# Patient Record
Sex: FEMALE | Race: WHITE | NOT HISPANIC OR LATINO | Employment: OTHER | URBAN - METROPOLITAN AREA
[De-identification: names, ages, dates, MRNs, and addresses within clinical notes are randomized per-mention and may not be internally consistent; named-entity substitution may affect disease eponyms.]

---

## 2019-06-29 ENCOUNTER — HOSPITAL ENCOUNTER (EMERGENCY)
Facility: MEDICAL CENTER | Age: 74
End: 2019-06-29
Attending: EMERGENCY MEDICINE
Payer: MEDICARE

## 2019-06-29 ENCOUNTER — APPOINTMENT (OUTPATIENT)
Dept: RADIOLOGY | Facility: MEDICAL CENTER | Age: 74
End: 2019-06-29
Attending: EMERGENCY MEDICINE
Payer: MEDICARE

## 2019-06-29 VITALS
HEIGHT: 67 IN | HEART RATE: 65 BPM | DIASTOLIC BLOOD PRESSURE: 82 MMHG | OXYGEN SATURATION: 99 % | SYSTOLIC BLOOD PRESSURE: 163 MMHG | WEIGHT: 131.61 LBS | RESPIRATION RATE: 16 BRPM | BODY MASS INDEX: 20.66 KG/M2 | TEMPERATURE: 98.5 F

## 2019-06-29 DIAGNOSIS — L03.115 CELLULITIS OF RIGHT LOWER EXTREMITY: ICD-10-CM

## 2019-06-29 PROCEDURE — 99284 EMERGENCY DEPT VISIT MOD MDM: CPT

## 2019-06-29 PROCEDURE — 700102 HCHG RX REV CODE 250 W/ 637 OVERRIDE(OP)

## 2019-06-29 PROCEDURE — 93971 EXTREMITY STUDY: CPT | Mod: RT

## 2019-06-29 PROCEDURE — A9270 NON-COVERED ITEM OR SERVICE: HCPCS

## 2019-06-29 PROCEDURE — A9270 NON-COVERED ITEM OR SERVICE: HCPCS | Performed by: EMERGENCY MEDICINE

## 2019-06-29 PROCEDURE — 700102 HCHG RX REV CODE 250 W/ 637 OVERRIDE(OP): Performed by: EMERGENCY MEDICINE

## 2019-06-29 RX ORDER — SULFAMETHOXAZOLE AND TRIMETHOPRIM 800; 160 MG/1; MG/1
1 TABLET ORAL ONCE
Status: COMPLETED | OUTPATIENT
Start: 2019-06-29 | End: 2019-06-29

## 2019-06-29 RX ORDER — IBUPROFEN 200 MG
200-600 TABLET ORAL EVERY 6 HOURS PRN
Status: SHIPPED | COMMUNITY
End: 2019-11-16

## 2019-06-29 RX ORDER — CEPHALEXIN 250 MG/1
CAPSULE ORAL
Status: COMPLETED
Start: 2019-06-29 | End: 2019-06-29

## 2019-06-29 RX ORDER — CEPHALEXIN 250 MG/1
500 CAPSULE ORAL ONCE
Status: COMPLETED | OUTPATIENT
Start: 2019-06-29 | End: 2019-06-29

## 2019-06-29 RX ORDER — MULTIVIT WITH MINERALS/LUTEIN
2000 TABLET ORAL DAILY
COMMUNITY

## 2019-06-29 RX ORDER — SULFAMETHOXAZOLE AND TRIMETHOPRIM 800; 160 MG/1; MG/1
TABLET ORAL
Status: COMPLETED
Start: 2019-06-29 | End: 2019-06-29

## 2019-06-29 RX ORDER — MULTIVIT WITH MINERALS/LUTEIN
1000 TABLET ORAL DAILY
Status: SHIPPED | COMMUNITY
End: 2019-11-16

## 2019-06-29 RX ORDER — CEPHALEXIN 500 MG/1
500 CAPSULE ORAL 3 TIMES DAILY
Qty: 21 CAP | Refills: 0 | Status: SHIPPED | OUTPATIENT
Start: 2019-06-29 | End: 2019-07-06

## 2019-06-29 RX ORDER — ACETAMINOPHEN 325 MG/1
650 TABLET ORAL ONCE
Status: COMPLETED | OUTPATIENT
Start: 2019-06-29 | End: 2019-06-29

## 2019-06-29 RX ORDER — SULFAMETHOXAZOLE AND TRIMETHOPRIM 800; 160 MG/1; MG/1
1 TABLET ORAL EVERY 12 HOURS
Qty: 14 TAB | Refills: 0 | Status: SHIPPED | OUTPATIENT
Start: 2019-06-29 | End: 2019-07-06

## 2019-06-29 RX ORDER — ITRACONAZOLE 100 MG/1
200 CAPSULE ORAL DAILY
Qty: 30 CAP | Refills: 0 | Status: SHIPPED | OUTPATIENT
Start: 2019-06-29 | End: 2019-11-16

## 2019-06-29 RX ADMIN — ACETAMINOPHEN 650 MG: 325 TABLET, FILM COATED ORAL at 11:58

## 2019-06-29 RX ADMIN — SULFAMETHOXAZOLE AND TRIMETHOPRIM 1 TABLET: 800; 160 TABLET ORAL at 13:31

## 2019-06-29 RX ADMIN — CEPHALEXIN 500 MG: 250 CAPSULE ORAL at 13:31

## 2019-06-29 NOTE — ED PROVIDER NOTES
ED Provider Note    CHIEF COMPLAINT  Chief Complaint   Patient presents with   • Leg Pain     right thigh, started thursday night while pt was sleeping.       HPI  Columba Pennington is a 73 y.o. female who presents with a report that she started having pain in her right thigh with a rash this past Thursday night while she was sleeping.  This is when she first noticed it but she apparently was working in the garden with and had a couple of pokes through her jeans at the right anterior distal thigh.  The patient states that outpatient this occurred about 3 to 4 days ago when working in the garden.  Denies any other complaints such as fever, chills, sweats.  She says the pain is increasing in severity and well localized nonradiating with no alleviating factors    REVIEW OF SYSTEMS  See HPI for further details. All other systems are negative.     PAST MEDICAL HISTORY  Past Medical History:   Diagnosis Date   • Arthritis     general bilat hips / back   • Chronic low back pain    • Pain     left hip 8/10   • TBI (Traumatic Brain Injury) 1960 1960       FAMILY HISTORY  Family History   Problem Relation Age of Onset   • Diabetes Mother        SOCIAL HISTORY   reports that she quit smoking about 55 years ago. Her smoking use included Cigarettes. She has a 0.50 pack-year smoking history. She has never used smokeless tobacco. She reports that she does not drink alcohol or use drugs.    SURGICAL HISTORY  Past Surgical History:   Procedure Laterality Date   • HIP ARTHROPLASTY TOTAL  3/1/2010    Performed by EVAN MILLS at SURGERY [facility]   • HIP ARTHROPLASTY TOTAL  12/7/2009    Performed by EVAN MILLS at SURGERY [facility]   • APPENDECTOMY  1962   • CRANIOTOMY BRAIN LAB  1960    after accident       CURRENT MEDICATIONS  Home Medications     Reviewed by Monica Spicer (Pharmacy Tech) on 06/29/19 at 1211  Med List Status: Complete   Medication Last Dose Status   Ascorbic Acid (VITAMIN C) 1000 MG  "Tab 6/28/2019 Active   B Complex Vitamins (VITAMIN B COMPLEX PO) 6/28/2019 Active   ibuprofen (MOTRIN) 200 MG Tab 6/29/2019 Active   Multiple Vitamin (MULTIVITAMIN PO) 6/28/2019 Active   TURMERIC PO 6/28/2019 Active   vitamin E (VITAMIN E) 1000 UNIT Cap 6/28/2019 Active                ALLERGIES  No Known Allergies    PHYSICAL EXAM  VITAL SIGNS: BP (!) 163/82   Pulse 66   Temp 36.8 °C (98.3 °F) (Temporal)   Resp 16   Ht 1.702 m (5' 7\")   Wt 59.7 kg (131 lb 9.8 oz)   SpO2 99%   BMI 20.61 kg/m²    Constitutional: Well developed, Well nourished, No acute distress, Non-toxic appearance.   HENT: Normocephalic, Atraumatic, Bilateral external ears normal, Oropharynx is clear mucous membranes are moist. No oral exudates or nasal discharge.   Eyes: Pupils are equal round and reactive, EOMI, Conjunctiva normal, No discharge.   Neck: Normal range of motion, No tenderness, Supple, No stridor. No meningismus.  Lymphatic: No lymphadenopathy noted.   Cardiovascular: Regular rate and rhythm without murmur rub or gallop.  Thorax & Lungs: Clear breath sounds bilaterally without wheezes, rhonchi or rales. There is no chest wall tenderness.   Abdomen: Soft non-tender non-distended. There is no rebound or guarding. No organomegaly is appreciated. Bowel sounds are normal.  Skin: Diffuse subtle stippled mild erythematous right thigh rash.  This is tender diffusely but more focally at the right thigh neurovascular bundle  Back: No CVA or spinal tenderness.   Extremities: Intact distal pulses, No edema, No tenderness, No cyanosis, No clubbing. Capillary refill is less than 2 seconds.  Musculoskeletal: Good range of motion in all major joints.  Right thigh neurovascular bundle tenderness   neurologic: Alert & oriented x 3, Normal motor function, Normal sensory function, No focal deficits noted. Reflexes are normal.  Psychiatric: Affect normal, Judgment normal, Mood normal. There is no suicidal ideation or patient reported " hallucinations.       RADIOLOGY/PROCEDURES  US-EXTREMITY VENOUS LOWER UNILAT RIGHT   Final Result      No evidence of right lower extremity deep venous thrombosis.            COURSE & MEDICAL DECISION MAKING  Pertinent Labs & Imaging studies reviewed. (See chart for details)  I had some concerns of possible blood clot and therefore ruled out DVT with right lower extremely ultrasound which is negative.  I am concerned about cellulitis but also concerned about Isela Contreras's disease given the history of being poked by [employer] earlier this week.  I believe she is best suited to treat both cellulitis and Isela Garden's disease with a combination of Sporanox as well as Bactrim and Keflex.  She will follow-up with her doctor to understand in the next week or 2 whether she should continue Sporanox therapy.  She will use Tylenol and ibuprofen for pain control and return if any significant change in symptoms    FINAL IMPRESSION  1. Cellulitis of right lower extremity             Electronically signed by: Jake Mckinney, 6/29/2019 1:38 PM

## 2019-06-29 NOTE — ED TRIAGE NOTES
"Chief Complaint   Patient presents with   • Leg Pain     right thigh, started thursday night while pt was sleeping.     Pt reports that she has never had a pain like this, denies injury.  Reports pain is different than pain referred from back. Pt also reports that she has a rash to her thigh. Pt was sent from .   BP (!) 163/82   Pulse 66   Temp 36.8 °C (98.3 °F) (Temporal)   Resp 16   Ht 1.702 m (5' 7\")   Wt 59.7 kg (131 lb 9.8 oz)   SpO2 99%   Pt informed of wait times. Educated on triage process.  Asked to return to triage RN for any new or worsening of symptoms. Thanked for patience.        "

## 2019-06-29 NOTE — ED NOTES
Pt assessed, R anterior/medial thigh pain since Thursday. Atraumatic. Ambulatory. Will cont to monitor. Rash noted as well.

## 2019-06-29 NOTE — DISCHARGE INSTRUCTIONS
Please see your primary care doctor in follow-up.  I am placing you on itraconazole for possible Isela Contreras's disease although this may just be typical cellulitis.  Your doctor may want to take you off the antifungal treatment if you are improving

## 2019-06-29 NOTE — ED NOTES
Discharge instructions provided. Rx  x3 given and pt educated on how and when to take medications, pt given oral and written dosages of tylenol and ibuprofen to take home for pain including max dosages, pt educated on s/s of infection and exacerbation of infection,  Pt verbalized the understanding of discharge instructions to follow up with PCP and to return to ER if condition worsens pt educated to seek medical attention for site assessment before Rx ran out, pt educated to seek medical attention if infection is noted..  Pt ambulated out of ER without difficulty.

## 2019-11-16 ENCOUNTER — APPOINTMENT (OUTPATIENT)
Dept: RADIOLOGY | Facility: MEDICAL CENTER | Age: 74
End: 2019-11-16
Attending: EMERGENCY MEDICINE
Payer: MEDICARE

## 2019-11-16 ENCOUNTER — HOSPITAL ENCOUNTER (EMERGENCY)
Facility: MEDICAL CENTER | Age: 74
End: 2019-11-16
Attending: EMERGENCY MEDICINE
Payer: MEDICARE

## 2019-11-16 VITALS
DIASTOLIC BLOOD PRESSURE: 74 MMHG | BODY MASS INDEX: 19.72 KG/M2 | SYSTOLIC BLOOD PRESSURE: 141 MMHG | TEMPERATURE: 97.7 F | WEIGHT: 125.66 LBS | OXYGEN SATURATION: 95 % | HEIGHT: 67 IN | RESPIRATION RATE: 16 BRPM | HEART RATE: 65 BPM

## 2019-11-16 DIAGNOSIS — R19.00 ABDOMINAL MASS, UNSPECIFIED ABDOMINAL LOCATION: ICD-10-CM

## 2019-11-16 DIAGNOSIS — R10.13 EPIGASTRIC PAIN: ICD-10-CM

## 2019-11-16 LAB
ALBUMIN SERPL BCP-MCNC: 4.2 G/DL (ref 3.2–4.9)
ALBUMIN/GLOB SERPL: 1.8 G/DL
ALP SERPL-CCNC: 132 U/L (ref 30–99)
ALT SERPL-CCNC: 25 U/L (ref 2–50)
ANION GAP SERPL CALC-SCNC: 12 MMOL/L (ref 0–11.9)
APPEARANCE UR: CLEAR
AST SERPL-CCNC: 30 U/L (ref 12–45)
BASOPHILS # BLD AUTO: 0.9 % (ref 0–1.8)
BASOPHILS # BLD: 0.05 K/UL (ref 0–0.12)
BILIRUB SERPL-MCNC: 0.6 MG/DL (ref 0.1–1.5)
BILIRUB UR QL STRIP.AUTO: NEGATIVE
BUN SERPL-MCNC: 13 MG/DL (ref 8–22)
CALCIUM SERPL-MCNC: 9.9 MG/DL (ref 8.4–10.2)
CHLORIDE SERPL-SCNC: 104 MMOL/L (ref 96–112)
CO2 SERPL-SCNC: 25 MMOL/L (ref 20–33)
COLOR UR: YELLOW
CREAT SERPL-MCNC: 0.71 MG/DL (ref 0.5–1.4)
EOSINOPHIL # BLD AUTO: 0.07 K/UL (ref 0–0.51)
EOSINOPHIL NFR BLD: 1.3 % (ref 0–6.9)
ERYTHROCYTE [DISTWIDTH] IN BLOOD BY AUTOMATED COUNT: 40.3 FL (ref 35.9–50)
GLOBULIN SER CALC-MCNC: 2.3 G/DL (ref 1.9–3.5)
GLUCOSE SERPL-MCNC: 94 MG/DL (ref 65–99)
GLUCOSE UR STRIP.AUTO-MCNC: NEGATIVE MG/DL
HCT VFR BLD AUTO: 44.5 % (ref 37–47)
HGB BLD-MCNC: 14.9 G/DL (ref 12–16)
IMM GRANULOCYTES # BLD AUTO: 0.02 K/UL (ref 0–0.11)
IMM GRANULOCYTES NFR BLD AUTO: 0.4 % (ref 0–0.9)
KETONES UR STRIP.AUTO-MCNC: NEGATIVE MG/DL
LEUKOCYTE ESTERASE UR QL STRIP.AUTO: NEGATIVE
LIPASE SERPL-CCNC: 24 U/L (ref 7–58)
LYMPHOCYTES # BLD AUTO: 0.61 K/UL (ref 1–4.8)
LYMPHOCYTES NFR BLD: 11.3 % (ref 22–41)
MCH RBC QN AUTO: 29.7 PG (ref 27–33)
MCHC RBC AUTO-ENTMCNC: 33.5 G/DL (ref 33.6–35)
MCV RBC AUTO: 88.8 FL (ref 81.4–97.8)
MICRO URNS: NORMAL
MONOCYTES # BLD AUTO: 0.5 K/UL (ref 0–0.85)
MONOCYTES NFR BLD AUTO: 9.3 % (ref 0–13.4)
NEUTROPHILS # BLD AUTO: 4.14 K/UL (ref 2–7.15)
NEUTROPHILS NFR BLD: 76.8 % (ref 44–72)
NITRITE UR QL STRIP.AUTO: NEGATIVE
NRBC # BLD AUTO: 0 K/UL
NRBC BLD-RTO: 0 /100 WBC
PH UR STRIP.AUTO: 6.5 [PH] (ref 5–8)
PLATELET # BLD AUTO: 257 K/UL (ref 164–446)
PMV BLD AUTO: 9.7 FL (ref 9–12.9)
POTASSIUM SERPL-SCNC: 4 MMOL/L (ref 3.6–5.5)
PROT SERPL-MCNC: 6.5 G/DL (ref 6–8.2)
PROT UR QL STRIP: NEGATIVE MG/DL
RBC # BLD AUTO: 5.01 M/UL (ref 4.2–5.4)
RBC UR QL AUTO: NEGATIVE
SODIUM SERPL-SCNC: 141 MMOL/L (ref 135–145)
SP GR UR STRIP.AUTO: 1.01
WBC # BLD AUTO: 5.4 K/UL (ref 4.8–10.8)

## 2019-11-16 PROCEDURE — 36415 COLL VENOUS BLD VENIPUNCTURE: CPT

## 2019-11-16 PROCEDURE — 80053 COMPREHEN METABOLIC PANEL: CPT

## 2019-11-16 PROCEDURE — 85025 COMPLETE CBC W/AUTO DIFF WBC: CPT

## 2019-11-16 PROCEDURE — 99284 EMERGENCY DEPT VISIT MOD MDM: CPT

## 2019-11-16 PROCEDURE — 700117 HCHG RX CONTRAST REV CODE 255: Performed by: EMERGENCY MEDICINE

## 2019-11-16 PROCEDURE — 81003 URINALYSIS AUTO W/O SCOPE: CPT

## 2019-11-16 PROCEDURE — 74177 CT ABD & PELVIS W/CONTRAST: CPT

## 2019-11-16 PROCEDURE — 83690 ASSAY OF LIPASE: CPT

## 2019-11-16 RX ORDER — ACETAMINOPHEN 325 MG/1
650 TABLET ORAL EVERY 6 HOURS PRN
Status: SHIPPED | COMMUNITY
End: 2020-05-19

## 2019-11-16 RX ORDER — HYDROCODONE BITARTRATE AND ACETAMINOPHEN 5; 325 MG/1; MG/1
1-2 TABLET ORAL EVERY 8 HOURS PRN
Qty: 15 TAB | Refills: 0 | Status: SHIPPED | OUTPATIENT
Start: 2019-11-16 | End: 2019-11-21

## 2019-11-16 RX ADMIN — IOHEXOL 100 ML: 350 INJECTION, SOLUTION INTRAVENOUS at 11:01

## 2019-11-16 ASSESSMENT — LIFESTYLE VARIABLES: DO YOU DRINK ALCOHOL: NO

## 2019-11-16 NOTE — ED TRIAGE NOTES
"Presents complaining of moderate abdominal pain recurring since this past September with associated nausea. She his under the care of her PCP.  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  Chief Complaint   Patient presents with   • Abdominal Pain     /82   Pulse 77   Temp 36.7 °C (98 °F) (Temporal)   Resp 18   Ht 1.702 m (5' 7\")   Wt 57 kg (125 lb 10.6 oz)   SpO2 94%   BMI 19.68 kg/m²     "

## 2019-11-16 NOTE — ED NOTES
Pt alert and oriented, NAD, VSS, IV discontinued in tact. Patient verbalized understanding of dc instructions and follow up.

## 2019-11-16 NOTE — DISCHARGE INSTRUCTIONS
Follow up with Outpatient biopsy, Can call [phone] ask for imaging    Follow up with Dr. Malcolm as scheduled on 11/21

## 2019-11-16 NOTE — ED PROVIDER NOTES
ED Provider Note    CHIEF COMPLAINT  Chief Complaint   Patient presents with   • Abdominal Pain       HPI  Columba Pennington is a 74 y.o. female who presents to emerge from today with complaints of abdominal pain.  Patient states she has abdominal pain since September getting worse had ultrasound performed 10/31 which showed possibility of incarcerated hernia.  Pain became more severe over the last 24 hours with episodes of nausea.  Pain is described as sharp to dull ache mid abdomen rating to the side on the right rated a 5/10 no fever chills she is had decreased appetite and weight loss of 5 to 10 pounds.    REVIEW OF SYSTEMS  See HPI for further details. All other systems are negative.     PAST MEDICAL HISTORY  Past Medical History:   Diagnosis Date   • TBI (Traumatic Brain Injury) 1960   • Arthritis     general bilat hips / back   • Chronic low back pain    • Pain     left hip 8/10       FAMILY HISTORY  [unfilled]    SOCIAL HISTORY  Social History     Socioeconomic History   • Marital status: Single     Spouse name: Not on file   • Number of children: 0   • Years of education: Not on file   • Highest education level: Not on file   Occupational History   • Occupation: Artist - watercolor     Employer: self employed   Social Needs   • Financial resource strain: Not on file   • Food insecurity:     Worry: Not on file     Inability: Not on file   • Transportation needs:     Medical: Not on file     Non-medical: Not on file   Tobacco Use   • Smoking status: Former Smoker     Packs/day: 0.50     Years: 1.00     Pack years: 0.50     Types: Cigarettes     Last attempt to quit: 1964     Years since quittin.9   • Smokeless tobacco: Never Used   Substance and Sexual Activity   • Alcohol use: No   • Drug use: No   • Sexual activity: Not on file   Lifestyle   • Physical activity:     Days per week: Not on file     Minutes per session: Not on file   • Stress: Not on file   Relationships   • Social connections:     " Talks on phone: Not on file     Gets together: Not on file     Attends Rastafari service: Not on file     Active member of club or organization: Not on file     Attends meetings of clubs or organizations: Not on file     Relationship status: Not on file   • Intimate partner violence:     Fear of current or ex partner: Not on file     Emotionally abused: Not on file     Physically abused: Not on file     Forced sexual activity: Not on file   Other Topics Concern   • Not on file   Social History Narrative   • Not on file       SURGICAL HISTORY  Past Surgical History:   Procedure Laterality Date   • HIP ARTHROPLASTY TOTAL  3/1/2010    Performed by EVAN MILLS at SURGERY [facility]S   • HIP ARTHROPLASTY TOTAL  12/7/2009    Performed by EVAN MILLS at SURGERY [facility] ORS   • APPENDECTOMY  1962   • CRANIOTOMY BRAIN LAB  1960    after accident       CURRENT MEDICATIONS  Home Medications     Reviewed by Monica Spicer (Pharmacy Tech) on 11/16/19 at 0957  Med List Status: Complete   Medication Last Dose Status   acetaminophen (TYLENOL) 325 MG Tab ABOUT 1 WEEK AGO Active   Ascorbic Acid (VITAMIN C) 1000 MG Tab 11/15/2019 Active   B Complex Vitamins (VITAMIN B COMPLEX PO) 11/15/2019 Active   Multiple Vitamin (MULTIVITAMIN PO) 11/15/2019 Active   TURMERIC PO 11/15/2019 Active                ALLERGIES  No Known Allergies    PHYSICAL EXAM  VITAL SIGNS: /74   Pulse 65   Temp 36.5 °C (97.7 °F)   Resp 16   Ht 1.702 m (5' 7\")   Wt 57 kg (125 lb 10.6 oz)   SpO2 95%   BMI 19.68 kg/m²  Room air O2: 95    Constitutional: Well developed, Well nourished, mild acute distress, Non-toxic appearance.   HENT: Normocephalic, Atraumatic, Bilateral external ears normal, Oropharynx moist, No oral exudates, Nose normal.   Eyes: PERRLA, EOMI, Conjunctiva normal, No discharge.   Neck: Normal range of motion, No tenderness, Supple, No stridor.   Lymphatic: No lymphadenopathy noted.   Cardiovascular: Normal heart " rate, Normal rhythm, No murmurs, No rubs, No gallops.   Thorax & Lungs: Normal breath sounds, No respiratory distress, No wheezing, No chest tenderness.   Abdomen: Bowel sounds normal, Soft,  tenderness, palpable mid abdominal mass, No pulsatile masses.  No rebound, guarding or peritoneal signs noted  Skin: Warm, Dry, No erythema, No rash.   Back: No tenderness, No CVA tenderness.   Extremities: Intact distal pulses, No edema, No tenderness, No cyanosis, No clubbing.   Musculoskeletal: Good range of motion in all major joints. No tenderness to palpation or major deformities noted.   Neurologic: Alert & oriented x 3, Normal motor function, Normal sensory function, No focal deficits noted.   Psychiatric: Affect normal, Judgment normal, Mood normal.         RADIOLOGY/PROCEDURES  CT-ABDOMEN-PELVIS WITH   Final Result      1.  Abnormal large solid mass centered in the small bowel mesentery with encasement of vessels is identified as described above. There is also some retroperitoneal adenopathy. Differential diagnosis includes lymphoma. Carcinoid tumor or desmoid tumor is    also possible. The mass does not appear to contain calcifications. Exophytic mass arising from small or large bowel is also within the differential diagnosis.      2.  Small amount of free peritoneal fluid is also identified.               COURSE & MEDICAL DECISION MAKING  Pertinent Labs & Imaging studies reviewed. (See chart for details)  Patient has abdominal mass large 15 x 15 x 10 most likely is causing patient's pain there is no evidence of hernia.  Patient has normal white blood cell count and normal temperature.  Discussed with patient in length that she needs next step of a biopsy that this cannot be done to Monday she wishes to go home I discussed case with radiologist Dr. Arceo and with scheduling as well as radiology and they will set up a CT scan guided biopsy on Monday if approved by insurance and she also has follow-up with surgeon   Yun on 11/21 meanwhile she was placed on pain medication of Norco for breakthrough pain we will try Tylenol Motrin first she understands this is opiate can be addictive no operating heavy machinery use of alcohol or driving on medication.  Verbalized understand instructions need for follow-up return if increased pain not controlled by medication, fever vomiting worsening symptoms patient verbalizes above instructions need for follow-up.    FINAL IMPRESSION  1.  Acute abdominal pain  2.  Acute peritoneal mass  3.         Electronically signed by: Amando Berry, 11/16/2019 3:34 PM

## 2020-01-24 ENCOUNTER — HOSPITAL ENCOUNTER (EMERGENCY)
Facility: MEDICAL CENTER | Age: 75
End: 2020-01-24
Attending: EMERGENCY MEDICINE
Payer: MEDICARE

## 2020-01-24 ENCOUNTER — APPOINTMENT (OUTPATIENT)
Dept: RADIOLOGY | Facility: MEDICAL CENTER | Age: 75
End: 2020-01-24
Attending: EMERGENCY MEDICINE
Payer: MEDICARE

## 2020-01-24 VITALS
BODY MASS INDEX: 19.62 KG/M2 | DIASTOLIC BLOOD PRESSURE: 68 MMHG | TEMPERATURE: 97.7 F | HEART RATE: 80 BPM | SYSTOLIC BLOOD PRESSURE: 123 MMHG | HEIGHT: 67 IN | RESPIRATION RATE: 16 BRPM | OXYGEN SATURATION: 96 % | WEIGHT: 125 LBS

## 2020-01-24 DIAGNOSIS — Z45.2 PICC (PERIPHERALLY INSERTED CENTRAL CATHETER) IN PLACE: ICD-10-CM

## 2020-01-24 DIAGNOSIS — T82.838A BLEEDING FROM PICC LINE, INITIAL ENCOUNTER (HCC): ICD-10-CM

## 2020-01-24 PROCEDURE — 99283 EMERGENCY DEPT VISIT LOW MDM: CPT

## 2020-01-24 PROCEDURE — 303485 HCHG DRESSING MEDIUM

## 2020-01-24 ASSESSMENT — LIFESTYLE VARIABLES: DO YOU DRINK ALCOHOL: NO

## 2020-01-25 NOTE — ED TRIAGE NOTES
"Pt comes in w/ friend   Had PICC line placed today to R upper arm for impending Chemo treatment   \"I was in a hurry to leave\"  Now having pain and bleeding to site   Instructions told her to seek medical advise if these symptoms come up   "

## 2020-01-25 NOTE — ED NOTES
Pt had picc line placed in left upper arm. C/o pain. Scant blood noted. Flushed well and without pain. + blood drawn back.

## 2020-01-25 NOTE — ED PROVIDER NOTES
ED Provider Note    ED Provider Note    Scribed for Brett Stevens MD by Brett Stevens M.D.. 1/24/2020, 10:38 PM.    Primary care provider: Dianna Donadl M.D.  Means of arrival: Private  History obtained from: Patient  History limited by: None    CHIEF COMPLAINT  Chief Complaint   Patient presents with   • Arm Pain     PICC line placed today at 1400 today   it's been hurting since and bleeding        HPI  Columba Pennington is a 74 y.o. female who presents to the Emergency Department discomfort and bleeding at PEG site.  This was placed today at 2 PM.  Patient notes she was feeling well, she had a busy day and does admit doing some heavy lifting including a large bag of dog food when she was at the grocery store.  She noted about 1 hour ago small amount of blood within the line but around the bio patch.  This was placed so she may undergo chemotherapy for her lymphoma, this will start on Monday.  Patient notes minimal pain at the time, no immediate complications.  He said no fever, no significant trauma, no numbness, no weakness.  Pain is localized to the site of the PICC of the right upper arm but does radiate somewhat up the arm, this does not involve the neck or the shoulder of the chest.  Pain is exacerbated when she is pronating the arm and also abducting, improved somewhat with sitting still.  Patient is not anticoagulated, does have a history of diabetes and is on metformin.    REVIEW OF SYSTEMS  Pertinent positives include pain at PEG site with visible blood near the Biopatch. Pertinent negatives include no marked trauma, not anticoagulated, no chest pain, no fever.      PAST MEDICAL HISTORY   has a past medical history of Arthritis, Chronic low back pain, Pain, and TBI (Traumatic Brain Injury) 1960 (1960).    SURGICAL HISTORY   has a past surgical history that includes craniotomy brain lab (1960); appendectomy (1962); hip arthroplasty total (12/7/2009); and hip arthroplasty total  "(3/1/2010).    SOCIAL HISTORY  Social History     Tobacco Use   • Smoking status: Never Smoker   • Smokeless tobacco: Never Used   Substance Use Topics   • Alcohol use: No   • Drug use: No      Social History     Substance and Sexual Activity   Drug Use No       FAMILY HISTORY  Family History   Problem Relation Age of Onset   • Diabetes Mother        CURRENT MEDICATIONS  Home Medications     Reviewed by Bhargavi Decker R.N. (Registered Nurse) on 01/24/20 at 2203  Med List Status: Unable to Obtain   Medication Last Dose Status   acetaminophen (TYLENOL) 325 MG Tab  Active   Ascorbic Acid (VITAMIN C) 1000 MG Tab  Active   B Complex Vitamins (VITAMIN B COMPLEX PO)  Active   MELATONIN PO 1/23/2020 Active   metFORMIN (GLUCOPHAGE) 500 MG Tab 1/24/2020 Active   MILK THISTLE EXTRACT PO  Active   Multiple Vitamin (MULTIVITAMIN PO)  Active   SELENIUM PO  Active   TURMERIC PO  Active                ALLERGIES  No Known Allergies    PHYSICAL EXAM  VITAL SIGNS: /70   Pulse (!) 59   Temp 36.5 °C (97.7 °F) (Temporal)   Resp 20   Ht 1.702 m (5' 7\")   Wt 56.7 kg (125 lb)   SpO2 96%   BMI 19.58 kg/m²     General: Alert, no acute distress  Skin: Warm, dry, normal for ethnicity  Head: Normocephalic, atraumatic  Neck: Trachea midline, no tenderness, no carotid bruit, no JVD.  Cardiovascular: Regular rate and rhythm, No murmur, Normal peripheral perfusion  Respiratory: Lungs CTA, respirations are non-labored, breath sounds are equal  Musculoskeletal: No swelling, no deformity.  2+ radial pulses symmetrical bilaterally, brisk cap refill.  No proximal streaking, no palpable venous cord on exam of the right upper extremity.  There is no erythema, no warmth, no crepitus.  PICC site appears clean, dry, intact with scant dry blood noted approximating the Biopatch.  No axillary lymphadenopathy.  Neurological: Alert and oriented to person, place, time, and situation  Lymphatics: No lymphadenopathy  Psychiatric: Cooperative, " "appropriate mood & affect          RADIOLOGY  DX-CHEST-FOR LINE PLACEMENT Perform procedure in: Patient's Room   Final Result      Peripherally inserted catheter has been placed and the tip projects appropriately over the superior vena cava.      X-ray viewed by myself and interpreted by the radiologist      The radiologist's interpretation of all radiological studies have been reviewed by me.    COURSE & MEDICAL DECISION MAKING  Pertinent Labs & Imaging studies reviewed. (See chart for details)    10:38 PM - Patient seen and examined at bedside. Patient will be treated with dressing change. Ordered x-ray imaging to evaluate her symptoms. The differential diagnoses include but are not limited to: Muscle skeletal pain, PICC malfunction    2320: Patient reassessed, she is in no acute distress and relieved here of unremarkable imaging.  Nursing to change her dressing at this time.    Patient Vitals for the past 24 hrs:   BP Temp Temp src Pulse Resp SpO2 Height Weight   01/24/20 2212 130/70 36.5 °C (97.7 °F) Temporal (!) 59 20 96 % -- --   01/24/20 2156 130/70 -- Temporal -- -- -- -- --   01/24/20 2155 -- -- -- -- -- -- 1.702 m (5' 7\") 56.7 kg (125 lb)         Decision Making:  This is a 74 y.o. year old female who presents with pain at the site of PICC placed today radiating up the arm.  The line draws and flushes well, there is no significant active bleeding on the exam.  She is well-appearing and nontoxic and patient herself is fully neurovascular intact.  X-rays obtained and thankfully confirms line placement, recommend rest, ice, elevation, pain is more consistent with likely localized pain/inflammation from the recent procedure, she may continue to use her pick as planned by my assessment.    The patient will return for new or worsening symptoms and is stable at the time of discharge.    Patient has had high blood pressure while in the emergency department, felt likely secondary to medical condition. Counseled " patient to monitor blood pressure at home and follow up with primary care physician.     DISPOSITION:  Patient will be discharged home in stable condition.    FOLLOW UP:  Dianna Donald M.D.  [address] [place] [address] [id number] [phone]    In 3 days  As needed      OUTPATIENT MEDICATIONS:  New Prescriptions    No medications on file         FINAL IMPRESSION  1. Bleeding from PICC line, initial encounter ([facility])    2. PICC (peripherally inserted central catheter) in place          Brett SANDOVAL M.D. ([place]), am scribing for, and in the presence of, Brett Stevens MD.    Electronically signed by: Brett Stevens M.D. ([place]), 1/24/2020    Bertt SANDOVAL MD personally performed the services described in this documentation, as scribed by Brett Stevens M.D. in my presence, and it is both accurate and complete    The note accurately reflects work and decisions made by me.  Brett Stevens M.D.  1/24/2020  11:22 PM

## 2020-05-19 ENCOUNTER — HOSPITAL ENCOUNTER (EMERGENCY)
Facility: MEDICAL CENTER | Age: 75
End: 2020-05-19
Attending: EMERGENCY MEDICINE
Payer: MEDICARE

## 2020-05-19 ENCOUNTER — APPOINTMENT (OUTPATIENT)
Dept: RADIOLOGY | Facility: MEDICAL CENTER | Age: 75
End: 2020-05-19
Attending: EMERGENCY MEDICINE
Payer: MEDICARE

## 2020-05-19 VITALS
BODY MASS INDEX: 20.07 KG/M2 | DIASTOLIC BLOOD PRESSURE: 61 MMHG | HEART RATE: 91 BPM | OXYGEN SATURATION: 95 % | WEIGHT: 127.87 LBS | HEIGHT: 67 IN | RESPIRATION RATE: 16 BRPM | SYSTOLIC BLOOD PRESSURE: 111 MMHG | TEMPERATURE: 98 F

## 2020-05-19 DIAGNOSIS — R18.8 OTHER ASCITES: ICD-10-CM

## 2020-05-19 DIAGNOSIS — R10.84 ACUTE GENERALIZED ABDOMINAL PAIN: ICD-10-CM

## 2020-05-19 LAB
ALBUMIN SERPL BCP-MCNC: 3.2 G/DL (ref 3.2–4.9)
ALBUMIN/GLOB SERPL: 1.8 G/DL
ALP SERPL-CCNC: 91 U/L (ref 30–99)
ALT SERPL-CCNC: 9 U/L (ref 2–50)
ANION GAP SERPL CALC-SCNC: 13 MMOL/L (ref 7–16)
AST SERPL-CCNC: 20 U/L (ref 12–45)
BASOPHILS # BLD AUTO: 0.6 % (ref 0–1.8)
BASOPHILS # BLD: 0.05 K/UL (ref 0–0.12)
BILIRUB SERPL-MCNC: 0.4 MG/DL (ref 0.1–1.5)
BUN SERPL-MCNC: 16 MG/DL (ref 8–22)
CALCIUM SERPL-MCNC: 8.6 MG/DL (ref 8.4–10.2)
CHLORIDE SERPL-SCNC: 98 MMOL/L (ref 96–112)
CO2 SERPL-SCNC: 21 MMOL/L (ref 20–33)
CREAT SERPL-MCNC: 0.75 MG/DL (ref 0.5–1.4)
EOSINOPHIL # BLD AUTO: 0.17 K/UL (ref 0–0.51)
EOSINOPHIL NFR BLD: 2.1 % (ref 0–6.9)
ERYTHROCYTE [DISTWIDTH] IN BLOOD BY AUTOMATED COUNT: 43.5 FL (ref 35.9–50)
GLOBULIN SER CALC-MCNC: 1.8 G/DL (ref 1.9–3.5)
GLUCOSE SERPL-MCNC: 92 MG/DL (ref 65–99)
HCT VFR BLD AUTO: 42.2 % (ref 37–47)
HGB BLD-MCNC: 14.4 G/DL (ref 12–16)
IMM GRANULOCYTES # BLD AUTO: 0.02 K/UL (ref 0–0.11)
IMM GRANULOCYTES NFR BLD AUTO: 0.2 % (ref 0–0.9)
LIPASE SERPL-CCNC: 16 U/L (ref 7–58)
LYMPHOCYTES # BLD AUTO: 0.73 K/UL (ref 1–4.8)
LYMPHOCYTES NFR BLD: 8.9 % (ref 22–41)
MCH RBC QN AUTO: 30.1 PG (ref 27–33)
MCHC RBC AUTO-ENTMCNC: 34.1 G/DL (ref 33.6–35)
MCV RBC AUTO: 88.3 FL (ref 81.4–97.8)
MONOCYTES # BLD AUTO: 0.88 K/UL (ref 0–0.85)
MONOCYTES NFR BLD AUTO: 10.7 % (ref 0–13.4)
NEUTROPHILS # BLD AUTO: 6.34 K/UL (ref 2–7.15)
NEUTROPHILS NFR BLD: 77.5 % (ref 44–72)
NRBC # BLD AUTO: 0 K/UL
NRBC BLD-RTO: 0 /100 WBC
PLATELET # BLD AUTO: 376 K/UL (ref 164–446)
PMV BLD AUTO: 10.1 FL (ref 9–12.9)
POTASSIUM SERPL-SCNC: 4.3 MMOL/L (ref 3.6–5.5)
PROT SERPL-MCNC: 5 G/DL (ref 6–8.2)
RBC # BLD AUTO: 4.78 M/UL (ref 4.2–5.4)
SODIUM SERPL-SCNC: 132 MMOL/L (ref 135–145)
WBC # BLD AUTO: 8.2 K/UL (ref 4.8–10.8)

## 2020-05-19 PROCEDURE — 80053 COMPREHEN METABOLIC PANEL: CPT

## 2020-05-19 PROCEDURE — 49083 ABD PARACENTESIS W/IMAGING: CPT

## 2020-05-19 PROCEDURE — 96376 TX/PRO/DX INJ SAME DRUG ADON: CPT | Mod: XU

## 2020-05-19 PROCEDURE — A9270 NON-COVERED ITEM OR SERVICE: HCPCS | Performed by: EMERGENCY MEDICINE

## 2020-05-19 PROCEDURE — 74177 CT ABD & PELVIS W/CONTRAST: CPT

## 2020-05-19 PROCEDURE — 36415 COLL VENOUS BLD VENIPUNCTURE: CPT

## 2020-05-19 PROCEDURE — 99285 EMERGENCY DEPT VISIT HI MDM: CPT

## 2020-05-19 PROCEDURE — 700111 HCHG RX REV CODE 636 W/ 250 OVERRIDE (IP): Performed by: EMERGENCY MEDICINE

## 2020-05-19 PROCEDURE — 700117 HCHG RX CONTRAST REV CODE 255: Performed by: EMERGENCY MEDICINE

## 2020-05-19 PROCEDURE — 85025 COMPLETE CBC W/AUTO DIFF WBC: CPT

## 2020-05-19 PROCEDURE — 83690 ASSAY OF LIPASE: CPT

## 2020-05-19 PROCEDURE — 700105 HCHG RX REV CODE 258: Performed by: EMERGENCY MEDICINE

## 2020-05-19 PROCEDURE — 96375 TX/PRO/DX INJ NEW DRUG ADDON: CPT | Mod: XU

## 2020-05-19 PROCEDURE — 96374 THER/PROPH/DIAG INJ IV PUSH: CPT | Mod: XU

## 2020-05-19 PROCEDURE — 700102 HCHG RX REV CODE 250 W/ 637 OVERRIDE(OP): Performed by: EMERGENCY MEDICINE

## 2020-05-19 RX ORDER — ONDANSETRON 2 MG/ML
4 INJECTION INTRAMUSCULAR; INTRAVENOUS ONCE
Status: COMPLETED | OUTPATIENT
Start: 2020-05-19 | End: 2020-05-19

## 2020-05-19 RX ORDER — MORPHINE SULFATE 4 MG/ML
4 INJECTION, SOLUTION INTRAMUSCULAR; INTRAVENOUS ONCE
Status: COMPLETED | OUTPATIENT
Start: 2020-05-19 | End: 2020-05-19

## 2020-05-19 RX ORDER — SODIUM CHLORIDE 9 MG/ML
1000 INJECTION, SOLUTION INTRAVENOUS ONCE
Status: COMPLETED | OUTPATIENT
Start: 2020-05-19 | End: 2020-05-19

## 2020-05-19 RX ADMIN — IOHEXOL 100 ML: 350 INJECTION, SOLUTION INTRAVENOUS at 12:01

## 2020-05-19 RX ADMIN — ONDANSETRON HYDROCHLORIDE 4 MG: 2 SOLUTION INTRAMUSCULAR; INTRAVENOUS at 12:13

## 2020-05-19 RX ADMIN — SODIUM CHLORIDE 1000 ML: 9 INJECTION, SOLUTION INTRAVENOUS at 11:29

## 2020-05-19 RX ADMIN — ONDANSETRON 4 MG: 2 INJECTION INTRAMUSCULAR; INTRAVENOUS at 10:25

## 2020-05-19 RX ADMIN — LIDOCAINE HYDROCHLORIDE 30 ML: 20 SOLUTION OROPHARYNGEAL at 10:25

## 2020-05-19 RX ADMIN — MORPHINE SULFATE 4 MG: 4 INJECTION INTRAVENOUS at 10:24

## 2020-05-19 ASSESSMENT — FIBROSIS 4 INDEX: FIB4 SCORE: 1.75

## 2020-05-19 NOTE — ED PROVIDER NOTES
ED Provider Note    ED Provider  Means of arrival: Private vehicle  History obtained from: Patient  History limited by: None    CHIEF COMPLAINT  Chief Complaint   Patient presents with   • Abdominal Pain     Treatment for lymphoma and reports her ABD has become larger over the last 4 days and painful with nausea.       HPI  Columba Pennington is a 74 y.o. female who presents with complaints of distention.  She has a history of lymphoma is currently under chemotherapy she is getting IV infusions in the right arm PICC line.  She had her last dose of chemotherapy was on Tuesday and since that time she is developing increased abdominal distention, and diffuse generalized abdominal pain.  She saw her oncologist no ultrasound done and there is questionable new admits that the head of the pancreas on ultrasound.    Her abdominal pain is diffuse, generalized, worse with walking.    He has had no nausea no vomiting no diarrhea.  No constipation last bowel movement was yesterday.  No fevers chills, no cough or congestion.    REVIEW OF SYSTEMS  See HPI for further details. All other systems are negative.     PAST MEDICAL HISTORY   has a past medical history of Arthritis, Chronic low back pain, Pain, and TBI (Traumatic Brain Injury) 1960 (1960).    SOCIAL HISTORY  Social History     Tobacco Use   • Smoking status: Never Smoker   • Smokeless tobacco: Never Used   Substance and Sexual Activity   • Alcohol use: No   • Drug use: No   • Sexual activity: Not on file       SURGICAL HISTORY   has a past surgical history that includes craniotomy brain lab (1960); appendectomy (1962); hip arthroplasty total (12/7/2009); and hip arthroplasty total (3/1/2010).    CURRENT MEDICATIONS  Home Medications     Reviewed by Monica Spicer (Pharmacy Tech) on 05/19/20 at 1043  Med List Status: Complete   Medication Last Dose Status   Ascorbic Acid (VITAMIN C) 1000 MG Tab FEW DAYS AGO Active   B Complex Vitamins (VITAMIN B COMPLEX PO) FEW DAYS  "AGO Active   Multiple Vitamin (MULTIVITAMIN PO) FEW DAYS AGO Active   TURMERIC PO FEW DAYS AGO Active   VITAMIN E PO FEW DAYS AGO Active                ALLERGIES  No Known Allergies    PHYSICAL EXAM  VITAL SIGNS: /68   Pulse 76   Temp 36.7 °C (98 °F) (Temporal)   Resp 16   Ht 1.702 m (5' 7\")   Wt 58 kg (127 lb 13.9 oz)   SpO2 100%   BMI 20.03 kg/m²   Constitutional: Alert in no apparent distress.  HENT: No signs of trauma, mucous membranes are moist  Eyes: Conjunctiva normal, Non-icteric.   Neck: Normal range of motion, No tenderness, Supple.  Lymphatic: No lymphadenopathy noted.   Cardiovascular: Tachycardia, no murmurs.   Thorax & Lungs: Normal breath sounds, No respiratory distress, No wheezing, No chest tenderness.   Abdomen: Bowel sounds normal, Soft, mild diffuse tenderness, no masses, No pulsatile masses. No peritoneal signs.  Abdomen is soft but is mildly distended  Skin: Warm, Dry, normal color.   Back: No bony tenderness, No CVA tenderness.   Extremities: No edema, No tenderness, No cyanosis  Musculoskeletal: Good range of motion in all major joints. No tenderness to palpation or major deformities noted.   Neurologic: Alert and oriented x4, Normal motor function, Normal sensory function, No focal deficits noted.   Psychiatric: Affect normal, Judgment normal, Mood normal.     Decision making: After initial exam, she has an abdominal distention this may be ascites versus bowel obstruction, with the pain there is concerns for pancreatitis cholecystitis and extension of her metastatic disease.  CT will be done for evaluation of this will be done to evaluate for electrolytes and pancreatitis.    Patient's chart was reviewed and shows no visits related to these symptoms today.  She presented with a ultrasound from [facility] where the pancreas shows a hypoechoic mass at the mid pancreas and a 18 mm cystic structure which is not significantly changed.  However the impression says there is a 2 cm " pancreatic head mass suspicious for malignancy or metastatic    DIAGNOSTIC STUDIES / PROCEDURES    EKG  12 Lead EKG interpreted by me shown below.      LABS  Results for orders placed or performed during the hospital encounter of 05/19/20   CBC WITH DIFFERENTIAL   Result Value Ref Range    WBC 8.2 4.8 - 10.8 K/uL    RBC 4.78 4.20 - 5.40 M/uL    Hemoglobin 14.4 12.0 - 16.0 g/dL    Hematocrit 42.2 37.0 - 47.0 %    MCV 88.3 81.4 - 97.8 fL    MCH 30.1 27.0 - 33.0 pg    MCHC 34.1 33.6 - 35.0 g/dL    RDW 43.5 35.9 - 50.0 fL    Platelet Count 376 164 - 446 K/uL    MPV 10.1 9.0 - 12.9 fL    Neutrophils-Polys 77.50 (H) 44.00 - 72.00 %    Lymphocytes 8.90 (L) 22.00 - 41.00 %    Monocytes 10.70 0.00 - 13.40 %    Eosinophils 2.10 0.00 - 6.90 %    Basophils 0.60 0.00 - 1.80 %    Immature Granulocytes 0.20 0.00 - 0.90 %    Nucleated RBC 0.00 /100 WBC    Neutrophils (Absolute) 6.34 2.00 - 7.15 K/uL    Lymphs (Absolute) 0.73 (L) 1.00 - 4.80 K/uL    Monos (Absolute) 0.88 (H) 0.00 - 0.85 K/uL    Eos (Absolute) 0.17 0.00 - 0.51 K/uL    Baso (Absolute) 0.05 0.00 - 0.12 K/uL    Immature Granulocytes (abs) 0.02 0.00 - 0.11 K/uL    NRBC (Absolute) 0.00 K/uL   COMP METABOLIC PANEL   Result Value Ref Range    Sodium 132 (L) 135 - 145 mmol/L    Potassium 4.3 3.6 - 5.5 mmol/L    Chloride 98 96 - 112 mmol/L    Co2 21 20 - 33 mmol/L    Anion Gap 13.0 7.0 - 16.0    Glucose 92 65 - 99 mg/dL    Bun 16 8 - 22 mg/dL    Creatinine 0.75 0.50 - 1.40 mg/dL    Calcium 8.6 8.4 - 10.2 mg/dL    AST(SGOT) 20 12 - 45 U/L    ALT(SGPT) 9 2 - 50 U/L    Alkaline Phosphatase 91 30 - 99 U/L    Total Bilirubin 0.4 0.1 - 1.5 mg/dL    Albumin 3.2 3.2 - 4.9 g/dL    Total Protein 5.0 (L) 6.0 - 8.2 g/dL    Globulin 1.8 (L) 1.9 - 3.5 g/dL    A-G Ratio 1.8 g/dL   LIPASE   Result Value Ref Range    Lipase 16 7 - 58 U/L   ESTIMATED GFR   Result Value Ref Range    GFR If African American >60 >60 mL/min/1.73 m 2    GFR If Non African American >60 >60 mL/min/1.73 m 2       All  labs reviewed by me.    RADIOLOGY  US-PARACENTESIS, ABD WITH IMAGING   Final Result      1. Ultrasound-guided therapeutic paracentesis of the right lower quadrant of the abdominal wall.      2. 3400 mL of fluid withdrawn.      CT-ABDOMEN-PELVIS WITH   Final Result      1.  Large volume ascites, markedly increased from prior exam, with improvement of retroperitoneal adenopathy as well.   2.  Significant interval reduction in size of mesenteric mass seen previously.   3.  No bowel obstruction or evidence for perforation.        The radiologist's interpretations of all radiological studies have been reviewed by me.    Films have been independently by me      COURSE  Pertinent Labs & Imaging studies reviewed. (See chart for details)        10:15 AM - Patient seen and examined at bedside. Discussed plan of care. The patient will be resuscitated with 1L NS IV for tachycardia     Patient's heart rate did improve after IV fluids.  CT showed no pancreatic head mass, and there is no signs of pancreatitis on lab tests.  She does have large amount of ascites.  I spoke with the patient's oncologist Dr. Arriaga, the plan was decided to do a therapeutic paracentesis.  Patient was brought down to radiology for ultrasound-guided paracentesis.  After paracentesis the patient's abdominal pain was completely resolved.  With that the patient is discharged home with outpatient follow-up      MEDICAL DECISION MAKING            FINAL IMPRESSION  1. Acute generalized abdominal pain    2. Other ascites            Electronically signed by: Edgar Nguyen D.O., 5/19/2020

## 2020-05-19 NOTE — ED NOTES
ERP at bedside. Pt agrees with plan of care discussed by ERP. AIDET acknowledged with patient. [place] in low position, side rail up for pt safety. Call light within reach. Will continue to monitor.

## 2020-05-19 NOTE — DISCHARGE INSTRUCTIONS
He had excessive fluid in the abdomen.  This is been drained, and your pain is improved.  With this you are stable for discharge home.  I did talk with Dr. Lara to discuss your CAT scan findings and he is aware that.  You are being discharged home.  Please take your time getting up, because he did the fluid out sometimes you may get a little lightheaded initially.  Diet as tolerated continue all previous care

## 2020-05-19 NOTE — ED TRIAGE NOTES
Chief Complaint   Patient presents with   • Abdominal Pain     Treatment for lymphoma and reports her ABD has become larger over the last 4 days and painful with nausea.

## 2020-05-19 NOTE — PROGRESS NOTES
US Guided Paracentesis performed by Dr. Salazar    Patient tolerated procedure well    3400ml removed from abdomen - access point RLQ    Transported back to ER in stable condition    sak

## 2020-05-24 ENCOUNTER — APPOINTMENT (OUTPATIENT)
Dept: RADIOLOGY | Facility: MEDICAL CENTER | Age: 75
DRG: 840 | End: 2020-05-24
Attending: EMERGENCY MEDICINE
Payer: MEDICARE

## 2020-05-24 ENCOUNTER — HOSPITAL ENCOUNTER (INPATIENT)
Facility: MEDICAL CENTER | Age: 75
LOS: 3 days | DRG: 840 | End: 2020-05-27
Attending: EMERGENCY MEDICINE | Admitting: HOSPITALIST
Payer: MEDICARE

## 2020-05-24 PROBLEM — C85.90 LYMPHOMA (HCC): Status: ACTIVE | Noted: 2020-05-24

## 2020-05-24 PROBLEM — E87.1 HYPONATREMIA: Status: ACTIVE | Noted: 2020-05-24

## 2020-05-24 PROBLEM — K65.2 SBP (SPONTANEOUS BACTERIAL PERITONITIS) (HCC): Status: ACTIVE | Noted: 2020-05-24

## 2020-05-24 LAB
ALBUMIN SERPL BCP-MCNC: 3.4 G/DL (ref 3.2–4.9)
ALBUMIN/GLOB SERPL: 1.8 G/DL
ALP SERPL-CCNC: 89 U/L (ref 30–99)
ALT SERPL-CCNC: 12 U/L (ref 2–50)
ANION GAP SERPL CALC-SCNC: 15 MMOL/L (ref 7–16)
APPEARANCE FLD: NORMAL
AST SERPL-CCNC: 21 U/L (ref 12–45)
BASOPHILS # BLD AUTO: 0.7 % (ref 0–1.8)
BASOPHILS # BLD: 0.08 K/UL (ref 0–0.12)
BILIRUB SERPL-MCNC: 0.2 MG/DL (ref 0.1–1.5)
BODY FLD TYPE: NORMAL
BUN SERPL-MCNC: 19 MG/DL (ref 8–22)
CALCIUM SERPL-MCNC: 9 MG/DL (ref 8.4–10.2)
CHLORIDE SERPL-SCNC: 96 MMOL/L (ref 96–112)
CO2 SERPL-SCNC: 21 MMOL/L (ref 20–33)
COLOR FLD: NORMAL
CREAT SERPL-MCNC: 0.93 MG/DL (ref 0.5–1.4)
EOSINOPHIL # BLD AUTO: 0.05 K/UL (ref 0–0.51)
EOSINOPHIL NFR BLD: 0.4 % (ref 0–6.9)
ERYTHROCYTE [DISTWIDTH] IN BLOOD BY AUTOMATED COUNT: 43.4 FL (ref 35.9–50)
GLOBULIN SER CALC-MCNC: 1.9 G/DL (ref 1.9–3.5)
GLUCOSE SERPL-MCNC: 102 MG/DL (ref 65–99)
GRAM STN SPEC: NORMAL
HCT VFR BLD AUTO: 50.8 % (ref 37–47)
HGB BLD-MCNC: 17.2 G/DL (ref 12–16)
IMM GRANULOCYTES # BLD AUTO: 0.05 K/UL (ref 0–0.11)
IMM GRANULOCYTES NFR BLD AUTO: 0.4 % (ref 0–0.9)
LIPASE SERPL-CCNC: 49 U/L (ref 7–58)
LYMPHOCYTES # BLD AUTO: 0.88 K/UL (ref 1–4.8)
LYMPHOCYTES NFR BLD: 7.6 % (ref 22–41)
LYMPHOCYTES NFR FLD: 53 %
MAGNESIUM SERPL-MCNC: 2.2 MG/DL (ref 1.5–2.5)
MCH RBC QN AUTO: 29.8 PG (ref 27–33)
MCHC RBC AUTO-ENTMCNC: 33.9 G/DL (ref 33.6–35)
MCV RBC AUTO: 87.9 FL (ref 81.4–97.8)
MESOTHL CELL NFR FLD: 5 %
MONOCYTES # BLD AUTO: 0.81 K/UL (ref 0–0.85)
MONOCYTES NFR BLD AUTO: 7 % (ref 0–13.4)
MONONUC CELLS NFR FLD: 14 %
NEUTROPHILS # BLD AUTO: 9.67 K/UL (ref 2–7.15)
NEUTROPHILS NFR BLD: 83.9 % (ref 44–72)
NEUTROPHILS NFR FLD: 28 %
NRBC # BLD AUTO: 0 K/UL
NRBC BLD-RTO: 0 /100 WBC
PLATELET # BLD AUTO: 510 K/UL (ref 164–446)
PMV BLD AUTO: 9.6 FL (ref 9–12.9)
POTASSIUM SERPL-SCNC: 5.1 MMOL/L (ref 3.6–5.5)
PROT SERPL-MCNC: 5.3 G/DL (ref 6–8.2)
RBC # BLD AUTO: 5.78 M/UL (ref 4.2–5.4)
RBC # FLD: 8000 CELLS/UL
SIGNIFICANT IND 70042: NORMAL
SITE SITE: NORMAL
SODIUM SERPL-SCNC: 132 MMOL/L (ref 135–145)
SOURCE SOURCE: NORMAL
WBC # BLD AUTO: 11.5 K/UL (ref 4.8–10.8)
WBC # FLD: NORMAL CELLS/UL

## 2020-05-24 PROCEDURE — A9270 NON-COVERED ITEM OR SERVICE: HCPCS | Performed by: HOSPITALIST

## 2020-05-24 PROCEDURE — 80053 COMPREHEN METABOLIC PANEL: CPT

## 2020-05-24 PROCEDURE — 89051 BODY FLUID CELL COUNT: CPT

## 2020-05-24 PROCEDURE — 700105 HCHG RX REV CODE 258: Performed by: EMERGENCY MEDICINE

## 2020-05-24 PROCEDURE — 770021 HCHG ROOM/CARE - ISO PRIVATE

## 2020-05-24 PROCEDURE — 700111 HCHG RX REV CODE 636 W/ 250 OVERRIDE (IP): Performed by: EMERGENCY MEDICINE

## 2020-05-24 PROCEDURE — A9270 NON-COVERED ITEM OR SERVICE: HCPCS | Performed by: EMERGENCY MEDICINE

## 2020-05-24 PROCEDURE — 83735 ASSAY OF MAGNESIUM: CPT

## 2020-05-24 PROCEDURE — 85025 COMPLETE CBC W/AUTO DIFF WBC: CPT

## 2020-05-24 PROCEDURE — 99223 1ST HOSP IP/OBS HIGH 75: CPT | Performed by: HOSPITALIST

## 2020-05-24 PROCEDURE — 49082 ABD PARACENTESIS: CPT

## 2020-05-24 PROCEDURE — 83690 ASSAY OF LIPASE: CPT

## 2020-05-24 PROCEDURE — 0W9G3ZZ DRAINAGE OF PERITONEAL CAVITY, PERCUTANEOUS APPROACH: ICD-10-PCS | Performed by: EMERGENCY MEDICINE

## 2020-05-24 PROCEDURE — 700102 HCHG RX REV CODE 250 W/ 637 OVERRIDE(OP): Performed by: EMERGENCY MEDICINE

## 2020-05-24 PROCEDURE — 99285 EMERGENCY DEPT VISIT HI MDM: CPT

## 2020-05-24 PROCEDURE — 700105 HCHG RX REV CODE 258: Performed by: HOSPITALIST

## 2020-05-24 PROCEDURE — 76705 ECHO EXAM OF ABDOMEN: CPT

## 2020-05-24 PROCEDURE — 87070 CULTURE OTHR SPECIMN AEROBIC: CPT

## 2020-05-24 PROCEDURE — 87205 SMEAR GRAM STAIN: CPT

## 2020-05-24 PROCEDURE — 700111 HCHG RX REV CODE 636 W/ 250 OVERRIDE (IP): Performed by: HOSPITALIST

## 2020-05-24 PROCEDURE — 96365 THER/PROPH/DIAG IV INF INIT: CPT

## 2020-05-24 PROCEDURE — 96375 TX/PRO/DX INJ NEW DRUG ADDON: CPT

## 2020-05-24 PROCEDURE — 700102 HCHG RX REV CODE 250 W/ 637 OVERRIDE(OP): Performed by: HOSPITALIST

## 2020-05-24 RX ORDER — ACETAMINOPHEN 325 MG/1
650 TABLET ORAL ONCE
Status: COMPLETED | OUTPATIENT
Start: 2020-05-24 | End: 2020-05-24

## 2020-05-24 RX ORDER — ONDANSETRON 2 MG/ML
4 INJECTION INTRAMUSCULAR; INTRAVENOUS EVERY 4 HOURS PRN
Status: DISCONTINUED | OUTPATIENT
Start: 2020-05-24 | End: 2020-05-27 | Stop reason: HOSPADM

## 2020-05-24 RX ORDER — ONDANSETRON 4 MG/1
4 TABLET, ORALLY DISINTEGRATING ORAL EVERY 4 HOURS PRN
Status: DISCONTINUED | OUTPATIENT
Start: 2020-05-24 | End: 2020-05-27 | Stop reason: HOSPADM

## 2020-05-24 RX ORDER — ALPRAZOLAM 0.25 MG/1
0.25 TABLET ORAL EVERY 6 HOURS PRN
Status: DISCONTINUED | OUTPATIENT
Start: 2020-05-24 | End: 2020-05-27 | Stop reason: HOSPADM

## 2020-05-24 RX ORDER — OXYCODONE HYDROCHLORIDE 5 MG/1
2.5 TABLET ORAL
Status: DISCONTINUED | OUTPATIENT
Start: 2020-05-24 | End: 2020-05-26

## 2020-05-24 RX ORDER — CALCIUM CARBONATE 500 MG/1
1000 TABLET, CHEWABLE ORAL EVERY 4 HOURS PRN
Status: DISCONTINUED | OUTPATIENT
Start: 2020-05-24 | End: 2020-05-27 | Stop reason: HOSPADM

## 2020-05-24 RX ORDER — BISACODYL 10 MG
10 SUPPOSITORY, RECTAL RECTAL
Status: DISCONTINUED | OUTPATIENT
Start: 2020-05-24 | End: 2020-05-27 | Stop reason: HOSPADM

## 2020-05-24 RX ORDER — POLYETHYLENE GLYCOL 3350 17 G/17G
1 POWDER, FOR SOLUTION ORAL
Status: DISCONTINUED | OUTPATIENT
Start: 2020-05-24 | End: 2020-05-27 | Stop reason: HOSPADM

## 2020-05-24 RX ORDER — CALCIUM CARBONATE 500 MG/1
500 TABLET, CHEWABLE ORAL DAILY
Status: DISCONTINUED | OUTPATIENT
Start: 2020-05-25 | End: 2020-05-24

## 2020-05-24 RX ORDER — HYDROMORPHONE HYDROCHLORIDE 1 MG/ML
0.25 INJECTION, SOLUTION INTRAMUSCULAR; INTRAVENOUS; SUBCUTANEOUS
Status: DISCONTINUED | OUTPATIENT
Start: 2020-05-24 | End: 2020-05-26

## 2020-05-24 RX ORDER — ACETAMINOPHEN 325 MG/1
650 TABLET ORAL EVERY 6 HOURS PRN
Status: DISCONTINUED | OUTPATIENT
Start: 2020-05-24 | End: 2020-05-27 | Stop reason: HOSPADM

## 2020-05-24 RX ORDER — AMOXICILLIN 250 MG
2 CAPSULE ORAL 2 TIMES DAILY
Status: DISCONTINUED | OUTPATIENT
Start: 2020-05-24 | End: 2020-05-27 | Stop reason: HOSPADM

## 2020-05-24 RX ORDER — ONDANSETRON 4 MG/1
4 TABLET, ORALLY DISINTEGRATING ORAL EVERY 6 HOURS PRN
COMMUNITY

## 2020-05-24 RX ORDER — OXYCODONE HYDROCHLORIDE 5 MG/1
5 TABLET ORAL
Status: DISCONTINUED | OUTPATIENT
Start: 2020-05-24 | End: 2020-05-26

## 2020-05-24 RX ORDER — MORPHINE SULFATE 4 MG/ML
4 INJECTION, SOLUTION INTRAMUSCULAR; INTRAVENOUS ONCE
Status: DISPENSED | OUTPATIENT
Start: 2020-05-24 | End: 2020-05-25

## 2020-05-24 RX ADMIN — ACETAMINOPHEN 650 MG: 325 TABLET, FILM COATED ORAL at 10:21

## 2020-05-24 RX ADMIN — CEFTRIAXONE SODIUM 1 G: 1 INJECTION, POWDER, FOR SOLUTION INTRAMUSCULAR; INTRAVENOUS at 15:03

## 2020-05-24 RX ADMIN — ACETAMINOPHEN 650 MG: 325 TABLET, FILM COATED ORAL at 20:21

## 2020-05-24 RX ADMIN — CEFTRIAXONE SODIUM 1 G: 1 INJECTION, POWDER, FOR SOLUTION INTRAMUSCULAR; INTRAVENOUS at 11:47

## 2020-05-24 ASSESSMENT — ENCOUNTER SYMPTOMS
NECK PAIN: 0
NERVOUS/ANXIOUS: 0
BLOOD IN STOOL: 0
TREMORS: 0
PALPITATIONS: 0
DOUBLE VISION: 0
EYE PAIN: 0
NAUSEA: 0
SPEECH CHANGE: 0
MYALGIAS: 0
CHILLS: 0
PND: 0
DIZZINESS: 0
CLAUDICATION: 0
SENSORY CHANGE: 0
SORE THROAT: 0
HEARTBURN: 0
PHOTOPHOBIA: 0
HEADACHES: 0
MEMORY LOSS: 0
STRIDOR: 0
CONSTIPATION: 0
ABDOMINAL PAIN: 1
BACK PAIN: 0
HEMOPTYSIS: 0
SPUTUM PRODUCTION: 0
TINGLING: 0
SHORTNESS OF BREATH: 0
COUGH: 0
ORTHOPNEA: 0
VOMITING: 0
FEVER: 0
BLURRED VISION: 0
WEAKNESS: 0
DEPRESSION: 0

## 2020-05-24 ASSESSMENT — COGNITIVE AND FUNCTIONAL STATUS - GENERAL
SUGGESTED CMS G CODE MODIFIER DAILY ACTIVITY: CH
SUGGESTED CMS G CODE MODIFIER MOBILITY: CJ
TURNING FROM BACK TO SIDE WHILE IN FLAT BAD: A LITTLE
DAILY ACTIVITIY SCORE: 24
MOVING TO AND FROM BED TO CHAIR: A LITTLE
MOBILITY SCORE: 22

## 2020-05-24 ASSESSMENT — LIFESTYLE VARIABLES
TOTAL SCORE: 0
TOTAL SCORE: 0
CONSUMPTION TOTAL: INCOMPLETE
EVER FELT BAD OR GUILTY ABOUT YOUR DRINKING: NO
TOTAL SCORE: 0
HAVE YOU EVER FELT YOU SHOULD CUT DOWN ON YOUR DRINKING: NO
EVER HAD A DRINK FIRST THING IN THE MORNING TO STEADY YOUR NERVES TO GET RID OF A HANGOVER: NO
EVER HAD A DRINK FIRST THING IN THE MORNING TO STEADY YOUR NERVES TO GET RID OF A HANGOVER: NO
TOTAL SCORE: 0
HOW MANY TIMES IN THE PAST YEAR HAVE YOU HAD 5 OR MORE DRINKS IN A DAY: 0
EVER FELT BAD OR GUILTY ABOUT YOUR DRINKING: NO
ON A TYPICAL DAY WHEN YOU DRINK ALCOHOL HOW MANY DRINKS DO YOU HAVE: 0
AVERAGE NUMBER OF DAYS PER WEEK YOU HAVE A DRINK CONTAINING ALCOHOL: 0
TOTAL SCORE: 0
HAVE PEOPLE ANNOYED YOU BY CRITICIZING YOUR DRINKING: NO
ALCOHOL_USE: NO
HAVE PEOPLE ANNOYED YOU BY CRITICIZING YOUR DRINKING: NO
CONSUMPTION TOTAL: NEGATIVE
DO YOU DRINK ALCOHOL: NO
TOTAL SCORE: 0
EVER_SMOKED: NEVER
HAVE YOU EVER FELT YOU SHOULD CUT DOWN ON YOUR DRINKING: NO

## 2020-05-24 ASSESSMENT — PATIENT HEALTH QUESTIONNAIRE - PHQ9
SUM OF ALL RESPONSES TO PHQ9 QUESTIONS 1 AND 2: 0
1. LITTLE INTEREST OR PLEASURE IN DOING THINGS: NOT AT ALL
2. FEELING DOWN, DEPRESSED, IRRITABLE, OR HOPELESS: NOT AT ALL

## 2020-05-24 ASSESSMENT — COPD QUESTIONNAIRES
IN THE PAST 12 MONTHS DO YOU DO LESS THAN YOU USED TO BECAUSE OF YOUR BREATHING PROBLEMS: DISAGREE/UNSURE
HAVE YOU SMOKED AT LEAST 100 CIGARETTES IN YOUR ENTIRE LIFE: NO/DON'T KNOW
DURING THE PAST 4 WEEKS HOW MUCH DID YOU FEEL SHORT OF BREATH: NONE/LITTLE OF THE TIME
COPD SCREENING SCORE: 2
DO YOU EVER COUGH UP ANY MUCUS OR PHLEGM?: NO/ONLY WITH OCCASIONAL COLDS OR INFECTIONS

## 2020-05-24 ASSESSMENT — PAIN DESCRIPTION - DESCRIPTORS: DESCRIPTORS: PRESSURE

## 2020-05-24 ASSESSMENT — FIBROSIS 4 INDEX: FIB4 SCORE: 1.33

## 2020-05-24 NOTE — CARE PLAN
Problem: Communication  Goal: The ability to communicate needs accurately and effectively will improve  Outcome: PROGRESSING AS EXPECTED  Intervention: [place] patient and significant other/support system to call light to alert staff of needs  Flowsheets (Taken 5/24/2020 1651)  Oriented to:: All of the Following : Location of Bathroom, Visiting Policy, Unit Routine, Call Light and Bedside Controls, Bedside Rail Policy, Smoking Policy, Rights and Responsibilities, Bedside Report, and Patient Education Notebook  Note: Pt oriented to unit upon arrival from ER. Pt instructed to utilize call light to call for assistance as needed. Pt verbalized understanding and calls appropriately for assistance. Hourly rounding in place to ensure needs are met.       Problem: Safety  Goal: Will remain free from falls  Outcome: PROGRESSING AS EXPECTED  Intervention: Implement fall precautions  Flowsheets (Taken 5/24/2020 1651)  Environmental Precautions:   Treaded Slipper Socks on Patient   Personal Belongings, Wastebasket, Call Bell etc. in Easy Reach   Report Given to Other Health Care Providers Regarding Fall Risk   Bed in Low Position   Mobility Assessed & Appropriate Sign Placed  Note: Environmental fall precautions and hourly rounding in place. Pt instructed to call for assistance before ambulating as needed. Pt verbalized understanding. Pt observed able to ambulate independently with no assistive device needed.

## 2020-05-24 NOTE — ED TRIAGE NOTES
Chief Complaint   Patient presents with   • Abdominal Pain      pt complains of abdominal swelling. Pt was diagnosed lymphoma  Last December and getting treated with chemo. abdomen distended and round, Pt states she had a paracentesis last Tuesday.     Pt has not traveled  internationally or domestic, has not had direct contact with known COVID-19 positive patient, or/ and  does not have fever or respiratory symptoms.

## 2020-05-24 NOTE — ED NOTES
Med rec updated and complete  Allergies reviewed  Called pt on cell phone @ [phone] to verify medications.   Pt reports no antibiotics in the last 2 weeks  Pt reports that she takes more vitamins, can't name them all.

## 2020-05-24 NOTE — ASSESSMENT & PLAN NOTE
Pending cultures.   Recurrent ascites is an ongoing problem  Continue zosyn  Will need lifelong antibiotics prophylaxis in the future.

## 2020-05-24 NOTE — ED NOTES
ERP at bedside. Pt agrees with plan of care discussed by ERP. AIDET acknowledged with patient. Blood to lab. Paracentesis at bedside. Specimen to lab. [place]ed[place] in low position, side rail up for pt safety. Call light within reach. Will continue to monitor.

## 2020-05-24 NOTE — H&P
[facility] Medicine History & Physical Note    Date of Service  5/24/2020    Primary Care Physician  Dianna Donald M.D.    Consultants  None    Code Status  Full Code    Chief Complaint  Chief Complaint   Patient presents with   • Abdominal Pain       History of Presenting Illness   is a very pleasant 74 y.o. female with a past medical history of lymphoma stage () with hx of recurrent ascitis,  presented to the emergency room on 5/24/2020 for evaluation of abdominal pain and distention that has been progressively worsening over the past several days.  Patient was recently here on the 19th which he underwent a paracentesis at that time.  Hence because of her distention and discomfort her oncologist recommended her to come to the emergency room.  She says the pain is dull, 5 out of 10 in severity which increases with movement as well as when laying flat and standing up.  At times she has associated shortness of breath due to the fullness of her abdomen.  But otherwise denies having any fever chills chest pain overt shortness of breath or nausea vomiting.  Denies any hematochezia or melena.    I discussed the presenting symptoms, physical examination, lab and radiological study results with the emergency department physician.      Review of Systems  Review of Systems   Constitutional: Negative for chills, fever and malaise/fatigue.   HENT: Negative for congestion, hearing loss, sore throat and tinnitus.    Eyes: Negative for blurred vision, double vision, photophobia and pain.   Respiratory: Negative for cough, hemoptysis, sputum production, shortness of breath and stridor.    Cardiovascular: Negative for chest pain, palpitations, orthopnea, claudication and PND.   Gastrointestinal: Positive for abdominal pain. Negative for blood in stool, constipation, heartburn, melena, nausea and vomiting.   Genitourinary: Negative for dysuria, frequency and urgency.   Musculoskeletal: Negative for back pain, myalgias  and neck pain.   Neurological: Negative for dizziness, tingling, tremors, sensory change, speech change, weakness and headaches.   Psychiatric/Behavioral: Negative for depression, memory loss and suicidal ideas. The patient is not nervous/anxious.    All other systems reviewed and are negative.      Past Medical History  Past Medical History:   Diagnosis Date   • TBI (Traumatic Brain Injury) 1960 1960   • Arthritis     general bilat hips / back   • Chronic low back pain    • Pain     left hip 8/10       Surgical History   has a past surgical history that includes craniotomy brain lab (1960); appendectomy (1962); hip arthroplasty total (12/7/2009); and hip arthroplasty total (3/1/2010).    Family History  family history includes Diabetes in her mother.    Social History   reports that she has never smoked. She has never used smokeless tobacco. She reports that she does not drink alcohol or use drugs.    Allergies  Allergies   Allergen Reactions   • Other Drug Vomiting     All narcotics        Medications  Prior to Admission medications    Medication Sig Start Date End Date Taking? Authorizing Provider   SELENIUM PO Take 1 Tab by mouth every day.   Yes Physician Outpatient   ondansetron (ZOFRAN ODT) 4 MG TABLET DISPERSIBLE Take 4 mg by mouth every 6 hours as needed for Nausea.   Yes Physician Outpatient   VITAMIN E PO Take 1 Tab by mouth every day.    Physician Outpatient   Ascorbic Acid (VITAMIN C) 1000 MG Tab Take 2,000 mg by mouth every day.    Physician Outpatient   TURMERIC PO Take 3 Tabs by mouth 2 Times a Day.    Physician Outpatient   B Complex Vitamins (VITAMIN B COMPLEX PO) Take 1 Tab by mouth every day.    Physician Outpatient   Multiple Vitamin (MULTIVITAMIN PO) Take 1 Tab by mouth every day.    Srg Physician       Physical Exam  Pulse:  [] 105  Resp:  [16] 16  BP: (125-130)/(82-86) 125/86  SpO2:  [96 %-99 %] 99 %  Physical Exam   Constitutional: She is oriented to person, place, and time. She  appears well-developed and well-nourished. No distress.   HENT:   Head: Normocephalic and atraumatic.   Mouth/Throat: No oropharyngeal exudate.   Eyes: Pupils are equal, round, and reactive to light. Conjunctivae are normal. Right eye exhibits no discharge. No scleral icterus.   Neck: Neck supple. No JVD present. No thyromegaly present.   Cardiovascular: Normal rate and intact distal pulses.   No murmur heard.  Pulmonary/Chest: Effort normal and breath sounds normal. No stridor. No respiratory distress. She has no wheezes. She has no rales.   Abdominal: Soft. Bowel sounds are normal. She exhibits no distension. There is abdominal tenderness (mild discomfort on palpation). There is no rebound.   Musculoskeletal: Normal range of motion.         General: No edema.   Neurological: She is alert and oriented to person, place, and time. No cranial nerve deficit.   Skin: Skin is warm. She is not diaphoretic. No erythema.   Psychiatric: She has a normal mood and affect. Her behavior is normal. Thought content normal.   Nursing note and vitals reviewed.      Laboratory:  Recent Labs     05/24/20  0849   WBC 11.5*   RBC 5.78*   HEMOGLOBIN 17.2*   HEMATOCRIT 50.8*   MCV 87.9   MCH 29.8   MCHC 33.9   RDW 43.4   PLATELETCT 510*   MPV 9.6     Recent Labs     05/24/20  0849   SODIUM 132*   POTASSIUM 5.1   CHLORIDE 96   CO2 21   GLUCOSE 102*   BUN 19   CREATININE 0.93   CALCIUM 9.0     Recent Labs     05/24/20  0849   ALTSGPT 12   ASTSGOT 21   ALKPHOSPHAT 89   TBILIRUBIN 0.2   LIPASE 49   GLUCOSE 102*               Urinalysis:          Imaging:  US-ABDOMEN LTD (SOFT TISSUE)    (Results Pending)       Assessment/Plan:  I anticipate this patient will require at least two midnights for appropriate medical management, necessitating inpatient admission.    * SBP (spontaneous bacterial peritonitis) ([facility])  Assessment & Plan  Highly suspect based on pain, fluid analysis.  Cultures pending   Initiated IV Ceftriaxone 2g q24 hours.  Cultures  pending  Pain control.       Lymphoma (HCC)  Assessment & Plan  Unclear stage, she follows with  which is an alternative cancer physician.   Unclear on stage of lymphoma.       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

## 2020-05-24 NOTE — PROGRESS NOTES
Pt arrived to floor from ER. Assumed care of patient. Discussed daily plan of care, oriented patient to floor. VSS. Pt denies significant pain at this time, denies nausea. Hourly rounding in place.

## 2020-05-24 NOTE — PROGRESS NOTES
2 RN skin assessment complete, skin not WDL. Puncture site located to right lower quadrant of abdomen, covered with adhesive bandage. Dressing cdi. No drainage.    No other wounds noted.

## 2020-05-24 NOTE — ED PROVIDER NOTES
ED Provider Note    CHIEF COMPLAINT  Chief Complaint   Patient presents with   • Abdominal Pain       HPI  Columba Pennington is a 74 y.o. female who presents to the emergency department planing of abdominal pain and distention.  She does have a history of lymphoma and frequently has ascites.  She was seen on the 19th of this month for paracentesis but states the fluid is reaccumulated.  She talked her oncologist is informed to come to our department for centesis.  The pain is dull, increases with movement and laying flat decreases with standing up, she has slight shortness of breath associated with the fullness of her abdomen.  Denies dysuria, [place], melena, hematemesis, fever, she is not taking anticoagulation currently.  REVIEW OF SYSTEMS  Positives as above. Pertinent negatives include fever, dysuria all other review of systems are negative    PAST MEDICAL HISTORY  Past Medical History:   Diagnosis Date   • Arthritis     general bilat hips / back   • Chronic low back pain    • Pain     left hip 8/10   • TBI (Traumatic Brain Injury) 1960 1960       FAMILY HISTORY  Noncontributory    SOCIAL HISTORY  Social History     Socioeconomic History   • Marital status: Single     Spouse name: Not on file   • Number of children: 0   • Years of education: Not on file   • Highest education level: Not on file   Occupational History   • Occupation: Artist - watercolor     Employer: self employed   Social Needs   • Financial resource strain: Not on file   • Food insecurity     Worry: Not on file     Inability: Not on file   • Transportation needs     Medical: Not on file     Non-medical: Not on file   Tobacco Use   • Smoking status: Never Smoker   • Smokeless tobacco: Never Used   Substance and Sexual Activity   • Alcohol use: No   • Drug use: No   • Sexual activity: Not on file   Lifestyle   • Physical activity     Days per week: Not on file     Minutes per session: Not on file   • Stress: Not on file   Relationships   • Social  "connections     Talks on phone: Not on file     Gets together: Not on file     Attends Baptism service: Not on file     Active member of club or organization: Not on file     Attends meetings of clubs or organizations: Not on file     Relationship status: Not on file   • Intimate partner violence     Fear of current or ex partner: Not on file     Emotionally abused: Not on file     Physically abused: Not on file     Forced sexual activity: Not on file   Other Topics Concern   • Not on file   Social History Narrative   • Not on file       SURGICAL HISTORY  Past Surgical History:   Procedure Laterality Date   • HIP ARTHROPLASTY TOTAL  3/1/2010    Performed by EVAN MILLS at SURGERY [facility]   • HIP ARTHROPLASTY TOTAL  12/7/2009    Performed by EVAN MILLS at SURGERY [facility]   • APPENDECTOMY  1962   • CRANIOTOMY BRAIN LAB  1960    after accident       CURRENT MEDICATIONS  Home Medications    **Home medications have not yet been reviewed for this encounter**         ALLERGIES  No Known Allergies    PHYSICAL EXAM  VITAL SIGNS: /82   Pulse 98   Resp 16   Ht 1.702 m (5' 7\")   Wt 56.4 kg (124 lb 5.4 oz)   SpO2 96%   BMI 19.47 kg/m²      Constitutional: Well developed, Well nourished, No acute distress, Non-toxic appearance.   Eyes: PERRLA, EOMI, Conjunctiva normal, No discharge.   Cardiovascular: Normal heart rate, Normal rhythm, No murmurs, No rubs, No gallops, and intact distal pulses.   Thorax & Lungs:  No respiratory distress, no rales, no rhonchi, No wheezing, No chest wall tenderness.   Abdomen: Distention, positive fluid shift/wave, slightly tender abdomen, no pulsatile mass  Skin: Warm, Dry, No erythema, No rash.   Extremities: Full range of motion, no deformity, no edema.  Neurologic: Alert & oriented x 3, No focal deficits noted, acting appropriately on exam.  Psychiatric: Affect normal for clinical presentation.      RADIOLOGY/PROCEDURES  Paracentesis Procedure " Note    Indication: Ascites    Consent: The patient was counseled regarding the procedure, it's indications, risks, potential complications and alternatives and any questions were answered. Consent was obtained.    Procedure: The patient was placed in the supine position and the appropriate landmarks were identified. The skin over the puncture site in the right lower quadrant region was prepped with betadine and chlorhexidine. Local anesthesia was obtained by infiltration using 1% Lidocaine without epinephrine. A paracentesis catheter was then advanced into the abdominal cavity over a needle and the needle was withdrawn. Fluid was returned which was clear.  A total volume of 1.5 Liters was withdrawn which was sent to the lab for cell count and differential and gram stain and culture. The catheter was then withdrawn and a sterile dressing was placed over the site.     The patient tolerated the procedure well.    Complications: None      US-ABDOMEN LTD (SOFT TISSUE)   Final Result      Small amount of ascites. Debris within the fluid is seen.      Fibrinous strand noted in the right lower quadrant. This may be related to a septation.              Results for orders placed or performed during the hospital encounter of 05/24/20   CBC WITH DIFFERENTIAL   Result Value Ref Range    WBC 11.5 (H) 4.8 - 10.8 K/uL    RBC 5.78 (H) 4.20 - 5.40 M/uL    Hemoglobin 17.2 (H) 12.0 - 16.0 g/dL    Hematocrit 50.8 (H) 37.0 - 47.0 %    MCV 87.9 81.4 - 97.8 fL    MCH 29.8 27.0 - 33.0 pg    MCHC 33.9 33.6 - 35.0 g/dL    RDW 43.4 35.9 - 50.0 fL    Platelet Count 510 (H) 164 - 446 K/uL    MPV 9.6 9.0 - 12.9 fL    Neutrophils-Polys 83.90 (H) 44.00 - 72.00 %    Lymphocytes 7.60 (L) 22.00 - 41.00 %    Monocytes 7.00 0.00 - 13.40 %    Eosinophils 0.40 0.00 - 6.90 %    Basophils 0.70 0.00 - 1.80 %    Immature Granulocytes 0.40 0.00 - 0.90 %    Nucleated RBC 0.00 /100 WBC    Neutrophils (Absolute) 9.67 (H) 2.00 - 7.15 K/uL    Lymphs (Absolute)  0.88 (L) 1.00 - 4.80 K/uL    Monos (Absolute) 0.81 0.00 - 0.85 K/uL    Eos (Absolute) 0.05 0.00 - 0.51 K/uL    Baso (Absolute) 0.08 0.00 - 0.12 K/uL    Immature Granulocytes (abs) 0.05 0.00 - 0.11 K/uL    NRBC (Absolute) 0.00 K/uL   COMP METABOLIC PANEL   Result Value Ref Range    Sodium 132 (L) 135 - 145 mmol/L    Potassium 5.1 3.6 - 5.5 mmol/L    Chloride 96 96 - 112 mmol/L    Co2 21 20 - 33 mmol/L    Anion Gap 15.0 7.0 - 16.0    Glucose 102 (H) 65 - 99 mg/dL    Bun 19 8 - 22 mg/dL    Creatinine 0.93 0.50 - 1.40 mg/dL    Calcium 9.0 8.4 - 10.2 mg/dL    AST(SGOT) 21 12 - 45 U/L    ALT(SGPT) 12 2 - 50 U/L    Alkaline Phosphatase 89 30 - 99 U/L    Total Bilirubin 0.2 0.1 - 1.5 mg/dL    Albumin 3.4 3.2 - 4.9 g/dL    Total Protein 5.3 (L) 6.0 - 8.2 g/dL    Globulin 1.9 1.9 - 3.5 g/dL    A-G Ratio 1.8 g/dL   LIPASE   Result Value Ref Range    Lipase 49 7 - 58 U/L   FLUID CELL COUNT   Result Value Ref Range    Fluid Type Paracentesis     Color-Body Fluid White     Character-Body Fluid Cloudy     Total RBC Count 8000 cells/uL    Total WBC 56001 cells/uL    Polys 28 %    Lymphs 53 %    Mononuclear Cells - Fluid 14 %    Mesothelial Cells - CSF 5 %   ESTIMATED GFR   Result Value Ref Range    GFR If African American >60 >60 mL/min/1.73 m 2    GFR If Non African American 59 (A) >60 mL/min/1.73 m 2   Magnesium   Result Value Ref Range    Magnesium 2.2 1.5 - 2.5 mg/dL         COURSE & MEDICAL DECISION MAKING  Pertinent Labs & Imaging studies reviewed. (See chart for details)  Is a pleasant 74-year-old female presents for abdominal pain.  She recently had a paracentesis without fluid evaluation.  Here in the emergency department, paracentesis was performed with 1.5 L expressed.  The patient did have a milky-like texture of the fluid.  The patient does have a leukocytosis of 11,500 and the white blood cell count within the peritoneal fluid was 12,780 suspicious for infectious etiology/SBP.  Patient received ceftriaxone for this.   For pain control, she received Tylenol 650 mg as well as morphine 4 mg IV.  The patient has been hospitalized with Dr. Alcala for further evaluation management of her ascites, SBP, abdominal pain.    New Prescriptions    No medications on file       FINAL IMPRESSION  Spontaneous bacterial peritonitis  Abdominal pain  Paracentesis         Electronically signed by: Dorian Graves D.O., 5/24/2020 8:58 AM

## 2020-05-24 NOTE — ED NOTES
Assumed care of pt-u/s at bedside. Pt c/o left sided chest pain, unrelieved with previous tylenol. VS as charted, call light in reach will notify erp of same,

## 2020-05-25 LAB
ALBUMIN SERPL BCP-MCNC: 3 G/DL (ref 3.2–4.9)
ALBUMIN/GLOB SERPL: 1.7 G/DL
ALP SERPL-CCNC: 81 U/L (ref 30–99)
ALT SERPL-CCNC: <5 U/L (ref 2–50)
ANION GAP SERPL CALC-SCNC: 11 MMOL/L (ref 7–16)
AST SERPL-CCNC: 18 U/L (ref 12–45)
BASOPHILS # BLD AUTO: 0.8 % (ref 0–1.8)
BASOPHILS # BLD: 0.08 K/UL (ref 0–0.12)
BILIRUB SERPL-MCNC: 0.2 MG/DL (ref 0.1–1.5)
BUN SERPL-MCNC: 14 MG/DL (ref 8–22)
CALCIUM SERPL-MCNC: 8.6 MG/DL (ref 8.4–10.2)
CHLORIDE SERPL-SCNC: 99 MMOL/L (ref 96–112)
CO2 SERPL-SCNC: 24 MMOL/L (ref 20–33)
CREAT SERPL-MCNC: 0.77 MG/DL (ref 0.5–1.4)
EOSINOPHIL # BLD AUTO: 0.18 K/UL (ref 0–0.51)
EOSINOPHIL NFR BLD: 1.9 % (ref 0–6.9)
ERYTHROCYTE [DISTWIDTH] IN BLOOD BY AUTOMATED COUNT: 42.9 FL (ref 35.9–50)
GLOBULIN SER CALC-MCNC: 1.8 G/DL (ref 1.9–3.5)
GLUCOSE SERPL-MCNC: 93 MG/DL (ref 65–99)
HCT VFR BLD AUTO: 47 % (ref 37–47)
HGB BLD-MCNC: 16.1 G/DL (ref 12–16)
IMM GRANULOCYTES # BLD AUTO: 0.06 K/UL (ref 0–0.11)
IMM GRANULOCYTES NFR BLD AUTO: 0.6 % (ref 0–0.9)
LYMPHOCYTES # BLD AUTO: 0.84 K/UL (ref 1–4.8)
LYMPHOCYTES NFR BLD: 8.7 % (ref 22–41)
MCH RBC QN AUTO: 29.8 PG (ref 27–33)
MCHC RBC AUTO-ENTMCNC: 34.3 G/DL (ref 33.6–35)
MCV RBC AUTO: 87 FL (ref 81.4–97.8)
MONOCYTES # BLD AUTO: 0.79 K/UL (ref 0–0.85)
MONOCYTES NFR BLD AUTO: 8.2 % (ref 0–13.4)
NEUTROPHILS # BLD AUTO: 7.69 K/UL (ref 2–7.15)
NEUTROPHILS NFR BLD: 79.8 % (ref 44–72)
NRBC # BLD AUTO: 0 K/UL
NRBC BLD-RTO: 0 /100 WBC
PLATELET # BLD AUTO: 426 K/UL (ref 164–446)
PMV BLD AUTO: 10.1 FL (ref 9–12.9)
POTASSIUM SERPL-SCNC: 4.8 MMOL/L (ref 3.6–5.5)
PROT SERPL-MCNC: 4.8 G/DL (ref 6–8.2)
RBC # BLD AUTO: 5.4 M/UL (ref 4.2–5.4)
SODIUM SERPL-SCNC: 134 MMOL/L (ref 135–145)
WBC # BLD AUTO: 9.6 K/UL (ref 4.8–10.8)

## 2020-05-25 PROCEDURE — 700105 HCHG RX REV CODE 258: Performed by: HOSPITALIST

## 2020-05-25 PROCEDURE — 80053 COMPREHEN METABOLIC PANEL: CPT

## 2020-05-25 PROCEDURE — 700111 HCHG RX REV CODE 636 W/ 250 OVERRIDE (IP): Performed by: HOSPITALIST

## 2020-05-25 PROCEDURE — 700102 HCHG RX REV CODE 250 W/ 637 OVERRIDE(OP): Performed by: HOSPITALIST

## 2020-05-25 PROCEDURE — A9270 NON-COVERED ITEM OR SERVICE: HCPCS | Performed by: HOSPITALIST

## 2020-05-25 PROCEDURE — 99233 SBSQ HOSP IP/OBS HIGH 50: CPT | Performed by: HOSPITALIST

## 2020-05-25 PROCEDURE — 85025 COMPLETE CBC W/AUTO DIFF WBC: CPT

## 2020-05-25 PROCEDURE — 770001 HCHG ROOM/CARE - MED/SURG/GYN PRIV*

## 2020-05-25 RX ORDER — OMEPRAZOLE 20 MG/1
20 CAPSULE, DELAYED RELEASE ORAL DAILY
Status: DISCONTINUED | OUTPATIENT
Start: 2020-05-25 | End: 2020-05-27 | Stop reason: HOSPADM

## 2020-05-25 RX ORDER — KETOROLAC TROMETHAMINE 30 MG/ML
15 INJECTION, SOLUTION INTRAMUSCULAR; INTRAVENOUS EVERY 6 HOURS PRN
Status: DISCONTINUED | OUTPATIENT
Start: 2020-05-25 | End: 2020-05-27 | Stop reason: HOSPADM

## 2020-05-25 RX ORDER — TRAZODONE HYDROCHLORIDE 50 MG/1
50 TABLET ORAL NIGHTLY PRN
Status: DISCONTINUED | OUTPATIENT
Start: 2020-05-25 | End: 2020-05-27 | Stop reason: HOSPADM

## 2020-05-25 RX ADMIN — ALPRAZOLAM 0.25 MG: 0.25 TABLET ORAL at 00:13

## 2020-05-25 RX ADMIN — ACETAMINOPHEN 650 MG: 325 TABLET, FILM COATED ORAL at 14:48

## 2020-05-25 RX ADMIN — MAGNESIUM HYDROXIDE 30 ML: 400 SUSPENSION ORAL at 14:51

## 2020-05-25 RX ADMIN — PIPERACILLIN AND TAZOBACTAM 3.38 G: 3; .375 INJECTION, POWDER, LYOPHILIZED, FOR SOLUTION INTRAVENOUS; PARENTERAL at 23:30

## 2020-05-25 RX ADMIN — PIPERACILLIN AND TAZOBACTAM 3.38 G: 3; .375 INJECTION, POWDER, LYOPHILIZED, FOR SOLUTION INTRAVENOUS; PARENTERAL at 12:05

## 2020-05-25 RX ADMIN — CALCIUM CARBONATE (ANTACID) CHEW TAB 500 MG 1000 MG: 500 CHEW TAB at 20:17

## 2020-05-25 RX ADMIN — OMEPRAZOLE 20 MG: 20 CAPSULE, DELAYED RELEASE ORAL at 12:03

## 2020-05-25 RX ADMIN — ACETAMINOPHEN 650 MG: 325 TABLET, FILM COATED ORAL at 20:17

## 2020-05-25 RX ADMIN — ACETAMINOPHEN 650 MG: 325 TABLET, FILM COATED ORAL at 05:24

## 2020-05-25 RX ADMIN — CALCIUM CARBONATE (ANTACID) CHEW TAB 500 MG 1000 MG: 500 CHEW TAB at 13:21

## 2020-05-25 RX ADMIN — ONDANSETRON 4 MG: 2 INJECTION INTRAMUSCULAR; INTRAVENOUS at 23:27

## 2020-05-25 RX ADMIN — CALCIUM CARBONATE (ANTACID) CHEW TAB 500 MG 1000 MG: 500 CHEW TAB at 05:24

## 2020-05-25 RX ADMIN — SENNOSIDES AND DOCUSATE SODIUM 2 TABLET: 8.6; 5 TABLET ORAL at 17:32

## 2020-05-25 RX ADMIN — HYDROMORPHONE HYDROCHLORIDE 0.25 MG: 1 INJECTION, SOLUTION INTRAMUSCULAR; INTRAVENOUS; SUBCUTANEOUS at 23:27

## 2020-05-25 RX ADMIN — KETOROLAC TROMETHAMINE 15 MG: 30 INJECTION, SOLUTION INTRAMUSCULAR at 12:04

## 2020-05-25 RX ADMIN — PIPERACILLIN AND TAZOBACTAM 3.38 G: 3; .375 INJECTION, POWDER, LYOPHILIZED, FOR SOLUTION INTRAVENOUS; PARENTERAL at 17:29

## 2020-05-25 RX ADMIN — CALCIUM CARBONATE (ANTACID) CHEW TAB 500 MG 1000 MG: 500 CHEW TAB at 00:10

## 2020-05-25 RX ADMIN — CEFTRIAXONE SODIUM 2 G: 2 INJECTION, POWDER, FOR SOLUTION INTRAMUSCULAR; INTRAVENOUS at 05:04

## 2020-05-25 ASSESSMENT — ENCOUNTER SYMPTOMS
DIZZINESS: 0
DEPRESSION: 0
VOMITING: 0
EYE PAIN: 0
HEADACHES: 0
INSOMNIA: 0
BLURRED VISION: 0
NAUSEA: 0
CONSTIPATION: 1
ABDOMINAL PAIN: 1
NECK PAIN: 0
SHORTNESS OF BREATH: 0
BACK PAIN: 0
FEVER: 0
SORE THROAT: 0
PALPITATIONS: 0
TINGLING: 0
CHILLS: 0
COUGH: 0

## 2020-05-25 NOTE — DIETARY
"Nutrition services: Day 1 of admit.  Columba Pennington is a 74 y.o. female with admitting DX of Spontaneous bacterial peritonitis.  Consult received for MST score of 2 per nutrition screen for unplanned weight loss of 14-23 lb in unknown time period.    Assessment:  Height: 170.2 cm (5' 7\")  Weight: 56.4 kg (124 lb 5.4 oz)  Body mass index is 19.47 kg/m²., BMI classification: Normal  Diet/Intake: Regular, Vegetarian, No dairy products.  Recorded PO intake 50-75% of snack 5/24.    Evaluation:   1. Pt with history of lymphoma and recurrent ascites. S/P paracentesis yesterday.  2. Per chart review, weight on 1/24/20 = 56.7 kg, consistent with current weight. Recent weight on 5/19/20 was higher at 58 kg, but pt also underwent a paracentesis at that time. Weights may vary due to fluid status as pt with recurrent ascites.  3. No poor PO intake noted per nutrition screen, but little PO intake recorded so far to assess.     Malnutrition Risk: Criteria not met.    Recommendations/Plan:  1. Regular, Vegetarian diet with no dairy products as ordered.   2. Encourage intake of meals.  3. Document intake of all meals as % taken in ADL's to provide interdisciplinary communication across all shifts.   4. Monitor weight.  5. Nutrition rep will continue to see patient for ongoing meal and snack preferences.     RD following.            "

## 2020-05-25 NOTE — CARE PLAN
Problem: Safety  Goal: Will remain free from injury  Outcome: PROGRESSING AS EXPECTED     Problem: Knowledge Deficit  Goal: Knowledge of disease process/condition, treatment plan, diagnostic tests, and medications will improve  Outcome: PROGRESSING AS EXPECTED     Problem: Pain Management  Goal: Pain level will decrease to patient's comfort goal  Outcome: PROGRESSING AS EXPECTED

## 2020-05-25 NOTE — ASSESSMENT & PLAN NOTE
Unclear stage but likely stage 4. She follows with  which is an alternative cancer physician.   Patient is not requiring pain medication

## 2020-05-25 NOTE — PROGRESS NOTES
Hospital Medicine Daily Progress Note    Date of Service  5/25/2020    Chief Complaint  74 y.o. female admitted 5/24/2020 with abdominal pain and distension. .     Hospital Course    She was though to have SBP on admission and treated for this. She has a known history of lymphoma with recurrent ascites.       Interval Problem Update  I increased her antibiotics to zosyn for now. I reviewed her imaging below. Anxious over night. I discussed her with ID today. No need for consultation. They agree with antibiotic change and lifelong antibiotics in the future. She is having trouble with pain and sleep. I added toradol and trazodone.       US abdomen  Small amount of ascites. Debris within the fluid is seen.  Fibrinous strand noted in the right lower quadrant. This may be related to a septation.      Consultants/Specialty  none    Code Status  full    Disposition  tbd- guarded prognosis. Will need lifelong antibiotic therapy for SBP.     Review of Systems  Review of Systems   Constitutional: Negative for chills and fever.   HENT: Negative for sore throat.    Eyes: Negative for blurred vision and pain.   Respiratory: Negative for cough and shortness of breath.    Cardiovascular: Negative for chest pain and palpitations.   Gastrointestinal: Positive for abdominal pain and constipation. Negative for nausea and vomiting.   Genitourinary: Negative for dysuria and urgency.   Musculoskeletal: Negative for back pain and neck pain.   Skin: Negative for itching and rash.   Neurological: Negative for dizziness, tingling and headaches.   Psychiatric/Behavioral: Negative for depression. The patient does not have insomnia.    All other systems reviewed and are negative.       Physical Exam  Temp:  [36.4 °C (97.6 °F)-36.9 °C (98.5 °F)] 36.5 °C (97.7 °F)  Pulse:  [] 100  Resp:  [17-18] 17  BP: (105-147)/(63-86) 122/77  SpO2:  [92 %-100 %] 96 %    Physical Exam  Vitals signs and nursing note reviewed.   Constitutional:        General: She is not in acute distress.     Appearance: She is well-developed. She is ill-appearing. She is not diaphoretic.   HENT:      Right Ear: External ear normal.      Left Ear: External ear normal.      Nose: Nose normal.   Eyes:      General:         Right eye: No discharge.         Left eye: No discharge.      Conjunctiva/sclera: Conjunctivae normal.   Neck:      Vascular: No JVD.   Cardiovascular:      Rate and Rhythm: Regular rhythm.      Heart sounds: Normal heart sounds. No murmur.   Pulmonary:      Effort: Pulmonary effort is normal. No respiratory distress.      Breath sounds: Normal breath sounds. No stridor. No wheezing or rales.   Abdominal:      General: Bowel sounds are normal. There is distension.      Palpations: Abdomen is soft.      Tenderness: There is abdominal tenderness.   Musculoskeletal:         General: No tenderness.   Skin:     General: Skin is warm and dry.      Coloration: Skin is pale.      Findings: No erythema.   Neurological:      Mental Status: She is alert and oriented to person, place, and time.   Psychiatric:         Behavior: Behavior normal.         Fluids    Intake/Output Summary (Last 24 hours) at 5/25/2020 0916  Last data filed at 5/25/2020 0500  Gross per 24 hour   Intake 200 ml   Output 5241 ml   Net -5041 ml       Laboratory  Recent Labs     05/24/20  0849 05/25/20  0515   WBC 11.5* 9.6   RBC 5.78* 5.40   HEMOGLOBIN 17.2* 16.1*   HEMATOCRIT 50.8* 47.0   MCV 87.9 87.0   MCH 29.8 29.8   MCHC 33.9 34.3   RDW 43.4 42.9   PLATELETCT 510* 426   MPV 9.6 10.1     Recent Labs     05/24/20  0849 05/25/20  0515   SODIUM 132* 134*   POTASSIUM 5.1 4.8   CHLORIDE 96 99   CO2 21 24   GLUCOSE 102* 93   BUN 19 14   CREATININE 0.93 0.77   CALCIUM 9.0 8.6                   Imaging  US-ABDOMEN LTD (SOFT TISSUE)   Final Result      Small amount of ascites. Debris within the fluid is seen.      Fibrinous strand noted in the right lower quadrant. This may be related to a septation.                  Assessment/Plan  * SBP (spontaneous bacterial peritonitis) (HCC)  Assessment & Plan  Pending cultures.   Recurrent ascites for some time now.   Change to zosyn for now.   Will need lifelong antibiotics prophylaxis in the future.   Likely to recur  Consider paracentesis buit not enough fluid present to do this presently.       Lymphoma (HCC)  Assessment & Plan  Unclear stage but I suspect stage 4. She follows with  which is an alternative cancer physician.   Symptom control.       Hyponatremia  Assessment & Plan  I suspect hypovolemic as she has not been eating . Trial of IV fluids.        VTE prophylaxis: scd

## 2020-05-25 NOTE — PROGRESS NOTES
Pt still complained of pain the abdomen. Tylenol and Tums was given. Morning lab drawn. Rounding in place.

## 2020-05-25 NOTE — PROGRESS NOTES
Pt stated having epigastric pain that radiates to the throat. Pt came out pacing up and down looks anxious.    0010 Dr. White paged and notified. New orders was given Tums and Xanax for anxiety. Pt was medicated per MAR. Pt now sleeping. All needs met at this time. Rounding in place. Call light within reached.

## 2020-05-25 NOTE — PROGRESS NOTES
Bedside report received from Vikram OSEI. Pt is AAO x 4.Pt report no pain level.POC discussed.All needs met at this time.Bed in low position.Call light within reach. Rounding in place.

## 2020-05-25 NOTE — PROGRESS NOTES
Report received from FARNAZ Rodriguez.  Patient resting comfortably in bed.  No needs at this time.

## 2020-05-25 NOTE — CARE PLAN
Problem: Nutritional:  Goal: Achieve adequate nutritional intake  Description: Patient will consume >50% of meals  Outcome: PROGRESSING AS EXPECTED

## 2020-05-25 NOTE — CARE PLAN
Problem: Communication  Goal: The ability to communicate needs accurately and effectively will improve  Outcome: PROGRESSING AS EXPECTED     Problem: Safety  Goal: Will remain free from injury  Outcome: PROGRESSING AS EXPECTED  Goal: Will remain free from falls  Outcome: PROGRESSING AS EXPECTED     Problem: Pain Management  Goal: Pain level will decrease to patient's comfort goal  Outcome: PROGRESSING AS EXPECTED     Problem: Psychosocial Needs:  Goal: Level of anxiety will decrease  Outcome: PROGRESSING AS EXPECTED

## 2020-05-26 ENCOUNTER — APPOINTMENT (OUTPATIENT)
Dept: RADIOLOGY | Facility: MEDICAL CENTER | Age: 75
DRG: 840 | End: 2020-05-26
Attending: INTERNAL MEDICINE
Payer: MEDICARE

## 2020-05-26 LAB
ANION GAP SERPL CALC-SCNC: 12 MMOL/L (ref 7–16)
APPEARANCE FLD: NORMAL
BODY FLD TYPE: NORMAL
BUN SERPL-MCNC: 21 MG/DL (ref 8–22)
CALCIUM SERPL-MCNC: 9.1 MG/DL (ref 8.4–10.2)
CHLORIDE SERPL-SCNC: 99 MMOL/L (ref 96–112)
CO2 SERPL-SCNC: 23 MMOL/L (ref 20–33)
COLOR FLD: NORMAL
CREAT SERPL-MCNC: 1.13 MG/DL (ref 0.5–1.4)
CSF COMMENTS 1658: NORMAL
EOSINOPHIL NFR FLD: 1 %
GLUCOSE SERPL-MCNC: 118 MG/DL (ref 65–99)
LYMPHOCYTES NFR FLD: 15 %
MONONUC CELLS NFR FLD: 3 %
NEUTROPHILS NFR FLD: 1 %
POTASSIUM SERPL-SCNC: 5.2 MMOL/L (ref 3.6–5.5)
RBC # FLD: 8000 CELLS/UL
SODIUM SERPL-SCNC: 134 MMOL/L (ref 135–145)
WBC # FLD: NORMAL CELLS/UL
WBC OTHER NFR FLD: 80 %

## 2020-05-26 PROCEDURE — 700102 HCHG RX REV CODE 250 W/ 637 OVERRIDE(OP): Performed by: HOSPITALIST

## 2020-05-26 PROCEDURE — 89051 BODY FLUID CELL COUNT: CPT

## 2020-05-26 PROCEDURE — 0W9G3ZZ DRAINAGE OF PERITONEAL CAVITY, PERCUTANEOUS APPROACH: ICD-10-PCS | Performed by: RADIOLOGY

## 2020-05-26 PROCEDURE — 700105 HCHG RX REV CODE 258: Performed by: HOSPITALIST

## 2020-05-26 PROCEDURE — 99356 PR PROLONGED SVC I/P OR OBS SETTING 1ST HOUR: CPT | Performed by: INTERNAL MEDICINE

## 2020-05-26 PROCEDURE — 99232 SBSQ HOSP IP/OBS MODERATE 35: CPT | Mod: 25 | Performed by: INTERNAL MEDICINE

## 2020-05-26 PROCEDURE — 82042 OTHER SOURCE ALBUMIN QUAN EA: CPT

## 2020-05-26 PROCEDURE — 700111 HCHG RX REV CODE 636 W/ 250 OVERRIDE (IP): Performed by: INTERNAL MEDICINE

## 2020-05-26 PROCEDURE — A9270 NON-COVERED ITEM OR SERVICE: HCPCS | Performed by: HOSPITALIST

## 2020-05-26 PROCEDURE — 700111 HCHG RX REV CODE 636 W/ 250 OVERRIDE (IP): Performed by: HOSPITALIST

## 2020-05-26 PROCEDURE — 49083 ABD PARACENTESIS W/IMAGING: CPT

## 2020-05-26 PROCEDURE — 80048 BASIC METABOLIC PNL TOTAL CA: CPT

## 2020-05-26 PROCEDURE — 700102 HCHG RX REV CODE 250 W/ 637 OVERRIDE(OP): Performed by: INTERNAL MEDICINE

## 2020-05-26 PROCEDURE — 87015 SPECIMEN INFECT AGNT CONCNTJ: CPT

## 2020-05-26 PROCEDURE — 87070 CULTURE OTHR SPECIMN AEROBIC: CPT

## 2020-05-26 PROCEDURE — 87205 SMEAR GRAM STAIN: CPT

## 2020-05-26 PROCEDURE — 80500 HCHG CLINICAL PATH CONSULT-LIMITED: CPT

## 2020-05-26 PROCEDURE — 700105 HCHG RX REV CODE 258: Performed by: INTERNAL MEDICINE

## 2020-05-26 PROCEDURE — 770001 HCHG ROOM/CARE - MED/SURG/GYN PRIV*

## 2020-05-26 PROCEDURE — A9270 NON-COVERED ITEM OR SERVICE: HCPCS | Performed by: INTERNAL MEDICINE

## 2020-05-26 RX ORDER — OXYCODONE HYDROCHLORIDE 5 MG/1
2.5 TABLET ORAL
Status: DISCONTINUED | OUTPATIENT
Start: 2020-05-26 | End: 2020-05-27 | Stop reason: HOSPADM

## 2020-05-26 RX ORDER — SODIUM CHLORIDE 9 MG/ML
1000 INJECTION, SOLUTION INTRAVENOUS CONTINUOUS
Status: DISCONTINUED | OUTPATIENT
Start: 2020-05-26 | End: 2020-05-27

## 2020-05-26 RX ORDER — HYDROMORPHONE HYDROCHLORIDE 1 MG/ML
0.25 INJECTION, SOLUTION INTRAMUSCULAR; INTRAVENOUS; SUBCUTANEOUS EVERY 4 HOURS PRN
Status: DISCONTINUED | OUTPATIENT
Start: 2020-05-26 | End: 2020-05-26

## 2020-05-26 RX ORDER — OXYCODONE HYDROCHLORIDE 5 MG/1
5 TABLET ORAL
Status: DISCONTINUED | OUTPATIENT
Start: 2020-05-26 | End: 2020-05-27 | Stop reason: HOSPADM

## 2020-05-26 RX ADMIN — HYDROMORPHONE HYDROCHLORIDE 0.25 MG: 1 INJECTION, SOLUTION INTRAMUSCULAR; INTRAVENOUS; SUBCUTANEOUS at 03:51

## 2020-05-26 RX ADMIN — PIPERACILLIN AND TAZOBACTAM 3.38 G: 3; .375 INJECTION, POWDER, LYOPHILIZED, FOR SOLUTION INTRAVENOUS; PARENTERAL at 05:46

## 2020-05-26 RX ADMIN — HYDROMORPHONE HYDROCHLORIDE 0.25 MG: 1 INJECTION, SOLUTION INTRAMUSCULAR; INTRAVENOUS; SUBCUTANEOUS at 07:08

## 2020-05-26 RX ADMIN — PIPERACILLIN AND TAZOBACTAM 3.38 G: 3; .375 INJECTION, POWDER, LYOPHILIZED, FOR SOLUTION INTRAVENOUS; PARENTERAL at 23:37

## 2020-05-26 RX ADMIN — ONDANSETRON 4 MG: 2 INJECTION INTRAMUSCULAR; INTRAVENOUS at 03:51

## 2020-05-26 RX ADMIN — OXYCODONE HYDROCHLORIDE 5 MG: 5 TABLET ORAL at 12:02

## 2020-05-26 RX ADMIN — ONDANSETRON 4 MG: 4 TABLET, ORALLY DISINTEGRATING ORAL at 08:59

## 2020-05-26 RX ADMIN — ALPRAZOLAM 0.25 MG: 0.25 TABLET ORAL at 04:08

## 2020-05-26 RX ADMIN — ACETAMINOPHEN 650 MG: 325 TABLET, FILM COATED ORAL at 03:56

## 2020-05-26 RX ADMIN — ACETAMINOPHEN 650 MG: 325 TABLET, FILM COATED ORAL at 17:52

## 2020-05-26 RX ADMIN — PIPERACILLIN AND TAZOBACTAM 3.38 G: 3; .375 INJECTION, POWDER, LYOPHILIZED, FOR SOLUTION INTRAVENOUS; PARENTERAL at 11:50

## 2020-05-26 RX ADMIN — SODIUM CHLORIDE 1000 ML: 9 INJECTION, SOLUTION INTRAVENOUS at 16:49

## 2020-05-26 RX ADMIN — OMEPRAZOLE 20 MG: 20 CAPSULE, DELAYED RELEASE ORAL at 05:45

## 2020-05-26 RX ADMIN — ACETAMINOPHEN 650 MG: 325 TABLET, FILM COATED ORAL at 23:41

## 2020-05-26 RX ADMIN — ACETAMINOPHEN 650 MG: 325 TABLET, FILM COATED ORAL at 10:42

## 2020-05-26 RX ADMIN — PIPERACILLIN AND TAZOBACTAM 3.38 G: 3; .375 INJECTION, POWDER, LYOPHILIZED, FOR SOLUTION INTRAVENOUS; PARENTERAL at 16:49

## 2020-05-26 ASSESSMENT — ENCOUNTER SYMPTOMS
COUGH: 0
FEVER: 0
DIAPHORESIS: 0
SHORTNESS OF BREATH: 0
WHEEZING: 0
SORE THROAT: 0
ABDOMINAL PAIN: 1
INSOMNIA: 0
NECK PAIN: 0
DIARRHEA: 0
DEPRESSION: 0
NAUSEA: 0
DIZZINESS: 0
PALPITATIONS: 0
EYE PAIN: 0
MYALGIAS: 0
BACK PAIN: 0
CONSTIPATION: 0
TINGLING: 0
VOMITING: 0
ORTHOPNEA: 0
HEARTBURN: 0
SINUS PAIN: 0
BLURRED VISION: 0

## 2020-05-26 NOTE — PROGRESS NOTES
[facility] Medicine Daily Progress Note    Date of Service  5/26/2020    Chief Complaint  74 y.o. female admitted 5/24/2020 with abdominal pain and distension. .     Hospital Course    She was though to have SBP on admission and treated for this. She has a known history of lymphoma with recurrent ascites.       Interval Problem Update  The patient had a paracentesis 5/24 and cultures are still negative  She remains on zosyn and pain is improved  ID recommended lifelong antibiotics      Consultants/Specialty  none    Code Status  full    Disposition  home guarded prognosis. Will need lifelong antibiotic therapy for SBP.     Review of Systems  Review of Systems   Constitutional: Negative for diaphoresis, fever and malaise/fatigue.   HENT: Negative for congestion, sinus pain and sore throat.    Eyes: Negative for blurred vision and pain.   Respiratory: Negative for cough, shortness of breath and wheezing.    Cardiovascular: Negative for chest pain, palpitations and orthopnea.   Gastrointestinal: Positive for abdominal pain. Negative for constipation, diarrhea, heartburn, nausea and vomiting.   Genitourinary: Negative for dysuria and urgency.   Musculoskeletal: Negative for back pain, myalgias and neck pain.   Skin: Negative for itching.   Neurological: Negative for dizziness and tingling.   Psychiatric/Behavioral: Negative for depression. The patient does not have insomnia.         Physical Exam  Temp:  [36.3 °C (97.4 °F)-36.6 °C (97.9 °F)] 36.4 °C (97.5 °F)  Pulse:  [83-98] 98  Resp:  [17-18] 18  BP: (112-131)/(67-82) 120/77  SpO2:  [96 %-97 %] 96 %    Physical Exam  Vitals signs and nursing note reviewed.   Constitutional:       General: She is not in acute distress.     Appearance: She is well-developed. She is ill-appearing.   HENT:      Right Ear: External ear normal.      Left Ear: External ear normal.      Nose: Nose normal.      Mouth/Throat:      Pharynx: No oropharyngeal exudate or posterior oropharyngeal  erythema.   Eyes:      General: No scleral icterus.     Conjunctiva/sclera: Conjunctivae normal.   Neck:      Vascular: No JVD.   Cardiovascular:      Rate and Rhythm: Normal rate and regular rhythm.      Heart sounds: Normal heart sounds. No friction rub.   Pulmonary:      Effort: No respiratory distress.      Breath sounds: Normal breath sounds. No stridor. No wheezing, rhonchi or rales.   Abdominal:      General: Bowel sounds are normal. There is distension.      Palpations: Abdomen is soft.      Tenderness: There is abdominal tenderness.   Musculoskeletal:         General: No tenderness.   Skin:     General: Skin is warm and dry.      Coloration: Skin is pale. Skin is not jaundiced.      Findings: No erythema.   Neurological:      Mental Status: She is alert and oriented to person, place, and time.      Coordination: Coordination normal.   Psychiatric:         Mood and Affect: Mood normal.         Behavior: Behavior normal.         Fluids    Intake/Output Summary (Last 24 hours) at 5/26/2020 0934  Last data filed at 5/26/2020 0341  Gross per 24 hour   Intake 896.67 ml   Output --   Net 896.67 ml       Laboratory  Recent Labs     05/24/20  0849 05/25/20  0515   WBC 11.5* 9.6   RBC 5.78* 5.40   HEMOGLOBIN 17.2* 16.1*   HEMATOCRIT 50.8* 47.0   MCV 87.9 87.0   MCH 29.8 29.8   MCHC 33.9 34.3   RDW 43.4 42.9   PLATELETCT 510* 426   MPV 9.6 10.1     Recent Labs     05/24/20  0849 05/25/20  0515 05/26/20  0400   SODIUM 132* 134* 134*   POTASSIUM 5.1 4.8 5.2   CHLORIDE 96 99 99   CO2 21 24 23   GLUCOSE 102* 93 118*   BUN 19 14 21   CREATININE 0.93 0.77 1.13   CALCIUM 9.0 8.6 9.1                   Imaging  US-ABDOMEN LTD (SOFT TISSUE)   Final Result      Small amount of ascites. Debris within the fluid is seen.      Fibrinous strand noted in the right lower quadrant. This may be related to a septation.                 Assessment/Plan  * SBP (spontaneous bacterial peritonitis) ([facility])  Assessment & Plan  Pending cultures.    Recurrent ascites is an ongoing problem  Continue zosyn  Will need lifelong antibiotics prophylaxis in the future.         Lymphoma (HCC)  Assessment & Plan  Unclear stage but likely stage 4. She follows with  which is an alternative cancer physician.   Patient is not requiring pain medication      Hyponatremia  Assessment & Plan  Stable and mild, patient is asymptomatic     I spent additional time discussing the patient with her primary care provider DR. Donald, oncology DR. Friedman and consulted GI Dr. Morales I spent from 13:10 until 13:44 with this patient, FACE to FACE discussing the plan, her concerns, possible carcinomatosis and other pathology; in addition to the daily examination and plan  VTE prophylaxis: scd

## 2020-05-26 NOTE — PROGRESS NOTES
Received patient, alert and oriented. VSS, complains of 7/10 abd pain. Discussed Plan of care, patient in agreement. Safety precautions in place. Will continue to monitor.

## 2020-05-26 NOTE — PROGRESS NOTES
Patient A&Ox4, anxious but cooperative with care.  VSS on RA.  Very resistant to taking medications.  Will continue to ask and monitor closely.  Tylenol works best for her abdominal pain.  IV antibiotics continue.  Patient up independently in room and walking in halls.  No acute changes or concerns at this time.

## 2020-05-26 NOTE — PROGRESS NOTES
US Guided paracentesis performed by Dr. Pal  Sonographer - Lisa MICHAELS    Pre and post imaging performed    Sterile, LUQ access with Yueh catheter    5000ml removed from abdomen, 505ml sent to lab    Patient tolerated procedure well, left in stable condition with bandaid LUQ abdomen    Pre-procedure vitals  /83  O2 97  BPM 76    During-procedure vitals  /86  O2 97  BPM 78    Post-procedure vitals   /77  O2 100  BPM 68

## 2020-05-27 VITALS
DIASTOLIC BLOOD PRESSURE: 61 MMHG | SYSTOLIC BLOOD PRESSURE: 111 MMHG | OXYGEN SATURATION: 98 % | HEIGHT: 67 IN | BODY MASS INDEX: 19.52 KG/M2 | HEART RATE: 80 BPM | RESPIRATION RATE: 18 BRPM | TEMPERATURE: 98.2 F | WEIGHT: 124.34 LBS

## 2020-05-27 PROBLEM — E87.1 HYPONATREMIA: Status: RESOLVED | Noted: 2020-05-24 | Resolved: 2020-05-27

## 2020-05-27 LAB
ALBUMIN FLD-MCNC: 1.9 G/DL
ANION GAP SERPL CALC-SCNC: 11 MMOL/L (ref 7–16)
BODY FLD TYPE: NORMAL
BUN SERPL-MCNC: 16 MG/DL (ref 8–22)
CALCIUM SERPL-MCNC: 8.1 MG/DL (ref 8.4–10.2)
CHLORIDE SERPL-SCNC: 102 MMOL/L (ref 96–112)
CO2 SERPL-SCNC: 22 MMOL/L (ref 20–33)
CREAT SERPL-MCNC: 0.94 MG/DL (ref 0.5–1.4)
CYTOLOGY REG CYTOL: NORMAL
GLUCOSE SERPL-MCNC: 170 MG/DL (ref 65–99)
GRAM STN SPEC: NORMAL
PATH REV: NORMAL
PATH REV: NORMAL
POTASSIUM SERPL-SCNC: 4 MMOL/L (ref 3.6–5.5)
SIGNIFICANT IND 70042: NORMAL
SITE SITE: NORMAL
SODIUM SERPL-SCNC: 135 MMOL/L (ref 135–145)
SOURCE SOURCE: NORMAL

## 2020-05-27 PROCEDURE — 88112 CYTOPATH CELL ENHANCE TECH: CPT

## 2020-05-27 PROCEDURE — 88305 TISSUE EXAM BY PATHOLOGIST: CPT

## 2020-05-27 PROCEDURE — 88342 IMHCHEM/IMCYTCHM 1ST ANTB: CPT

## 2020-05-27 PROCEDURE — A9270 NON-COVERED ITEM OR SERVICE: HCPCS | Performed by: INTERNAL MEDICINE

## 2020-05-27 PROCEDURE — 80048 BASIC METABOLIC PNL TOTAL CA: CPT

## 2020-05-27 PROCEDURE — 99239 HOSP IP/OBS DSCHRG MGMT >30: CPT | Performed by: INTERNAL MEDICINE

## 2020-05-27 PROCEDURE — 700111 HCHG RX REV CODE 636 W/ 250 OVERRIDE (IP): Performed by: INTERNAL MEDICINE

## 2020-05-27 PROCEDURE — 700105 HCHG RX REV CODE 258: Performed by: INTERNAL MEDICINE

## 2020-05-27 PROCEDURE — 88341 IMHCHEM/IMCYTCHM EA ADD ANTB: CPT

## 2020-05-27 PROCEDURE — 700102 HCHG RX REV CODE 250 W/ 637 OVERRIDE(OP): Performed by: INTERNAL MEDICINE

## 2020-05-27 RX ORDER — CIPROFLOXACIN 500 MG/1
500 TABLET, FILM COATED ORAL EVERY 12 HOURS
Status: DISCONTINUED | OUTPATIENT
Start: 2020-05-27 | End: 2020-05-27 | Stop reason: HOSPADM

## 2020-05-27 RX ORDER — CIPROFLOXACIN 500 MG/1
500 TABLET, FILM COATED ORAL EVERY 12 HOURS
Qty: 14 TAB | Refills: 0 | Status: SHIPPED | OUTPATIENT
Start: 2020-05-27 | End: 2020-06-03

## 2020-05-27 RX ADMIN — CIPROFLOXACIN 500 MG: 500 TABLET, FILM COATED ORAL at 11:16

## 2020-05-27 RX ADMIN — PIPERACILLIN AND TAZOBACTAM 3.38 G: 3; .375 INJECTION, POWDER, LYOPHILIZED, FOR SOLUTION INTRAVENOUS; PARENTERAL at 05:39

## 2020-05-27 NOTE — PROGRESS NOTES
Patient A&Ox4, anxious but cooperative with care.  VSS on RA, complains of intense pain, PRN oxy and tylenol given with mild relief.  Patient went down for paracentesis this afternoon.  Much relief after procedure.  No acute changes or concerns at this time.

## 2020-05-27 NOTE — PROGRESS NOTES
Spiritual Care Note    Patient Information     Patient's Name: Columba Pennington   MRN: [id number]    YOB: 1945   Age and Gender: 74 y.o. female   Service Area: GEN SURGERY [facility]   Room (and Bed): 2208/00   Ethnicity or Nationality:     Primary Language: English   Holiness/Spiritual preference: Spiritual   Place of Residence: [place]   Family/Friends/Others Present: By phone   Clinical Team Present: Nurse   Medical Diagnosis(-es)/Procedure(s): SBP - Abdominal Pain   Code Status: Full Code    Date of Admission: 5/24/2020   Length of Stay: 2 days        Spiritual Care Provider Information:  Name of Spiritual Care Provider: Ursula Bosch  Title of Spiritual Care Provider: Associate   Phone Number: [phone]  E-mail: [email or web address]  Total time : 60 minutes    Spiritual Screen Results:    Gen Nursing  Spiritual Screen  Is your spiritual health or inner well-being important to you as you cope with your medical condition?: Yes  Would you like to receive a visit from our Spiritual Care team or your own Baptism or spiritual leader?: No  Was spiritual care education provided to the patient?: Declined     Palliative Care  PC Holiness/Spiritual Screening  Was spiritual care education provided to the patient?: Declined      Encounter/Request Information  Encounter/Request Type   Visited With: Patient, Health care provider  Nature of the Visit: Initial, On shift  Continue Visiting: (UPON REQUEST)  General Visit: Yes  Referral From/ Origin of Request: Epic nursing    Religous Needs/Values       Spiritual Assessment   Spiritual Care Encounters     Observations/Symptoms: Accepting, Thankfulness, Frustration    Interacton/Conversation:  checked in with BS Nurse, then entered pt's room, introduced self to pt. Pt had just returned from paracentisis procedure. Pt was alert, feeling much better than before procedure. Pt shared openly about her difficulties with her  "cancer since starting to not feel well last September.     Pt stated she lives alone with her dog, in [place]. Pt is very ecologically minded. Pt stated, \"I've been in [facility] for 11 years, and my sponsor says I'm very impatient. But I am having difficulty connecting with my Higher Power, as the literature talks about it. I say God, but I'm just having trouble connecting to God.\"     Pt struggled with being given information that \"I'll just have to live with this liquid in my body, it's from the cancer and there's nothing that can be done.\" Pt stated she asked for her PCP to be called, and that call was made by [facility] Doctor.      held conversation with pt regarding how she imagines God. 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 Pt stated, \"that was really a blow. I was really looking forward to that.\" Pt stated she imagines God as Light, like sun through the clouds in the alexia, or through a forest , or the night alexia with the stars and planets. Pt received a call from her PCP.  left pt's room - Pt thanked  for the visit.    Assessment: Need, Distress  Need: Seeking Spiritual Assistance and Support  Distress: Anxiety about the Future, Conflict between Beliefs, Values and Recommended Treatment, Overwhelmed, Upsetting Diagnosis/Prognosis    Interventions: Compassionate Presence, Reflective Listening, Reference to Carisa on Phone for Alexia Portal    Outcomes: Ability to Communicate with Truth and Honesty, Coping, Spiritual Comfort, Value/Dignity/Respect, Sense of Control    Plan: Visit Upon Request    Notes:            "

## 2020-05-27 NOTE — DISCHARGE SUMMARY
Discharge Summary    CHIEF COMPLAINT ON ADMISSION  Chief Complaint   Patient presents with   • Abdominal Pain       Reason for Admission  Abd Swelling     Admission Date  5/24/2020    CODE STATUS  Full Code    HPI & HOSPITAL COURSE  This is a 74 y.o. female here with known abdominal lymphoma who sees Dr. Trevino who does alternative and traditional treatment for cancer. She had increased abdominal distention despite having a paracentesis 5/19 with 3.4 liters removed. She had 500mL removed on 5/24 and had evidence of SBP by PMN count over 250. She was treated with zosyn. As only 500mL of fluid were removed and the patient had continued abdominal discomfort and distention, radiology consulted and Dr. Pal removed 5000 mL on 5/26. The patient had much improved distention and discomfort and this fluid revealed 80% malignant cells. She was seen by GI and follow up is arranged outpatient for paracentesis as needed for fluid reaccumulation. She is also to complete 7 days of cipro for her SBP.     Therefore, she is discharged in fair and stable condition to home with close outpatient follow-up.    The patient met 2-midnight criteria for an inpatient stay at the time of discharge.    Discharge Date  5/27/20    FOLLOW UP ITEMS POST DISCHARGE  GI Dr. Morales for paracentesis as needed  Dr. Trevino as scheduled for chemotherapy/cancer treatment    DISCHARGE DIAGNOSES  Principal Problem:    SBP (spontaneous bacterial peritonitis) (HCC) POA: Yes  Active Problems:    Lymphoma (HCC) POA: Yes  Resolved Problems:    Hyponatremia POA: Yes      FOLLOW UP  No future appointments.  Dianna Donald M.D.  [address] [facility] [address] [phone]          Carlton Morales D.O.  [address] [place] [id number] [phone]    In 1 week  for paracentesis, abdominal fluid removal    Giovani Trevino M.D.  [address] [place] [address] [phone]            MEDICATIONS ON DISCHARGE      Medication List      START taking these medications      Instructions   ciprofloxacin 500 MG Tabs  Commonly known as:  CIPRO   Take 1 Tab by mouth every 12 hours for 7 days.  Dose:  500 mg        CONTINUE taking these medications      Instructions   MULTIVITAMIN PO   Take 1 Tab by mouth every day.  Dose:  1 Tab     ondansetron 4 MG Tbdp  Commonly known as:  ZOFRAN ODT   Take 4 mg by mouth every 6 hours as needed for Nausea.  Dose:  4 mg     SELENIUM PO   Take 1 Tab by mouth every day.  Dose:  1 Tab     TURMERIC PO   Take 3 Tabs by mouth 2 Times a Day.  Dose:  3 Tab     VITAMIN B COMPLEX PO   Take 1 Tab by mouth every day.  Dose:  1 Tab     Vitamin C 1000 MG Tabs   Take 2,000 mg by mouth every day.  Dose:  2,000 mg     VITAMIN E PO   Take 1 Tab by mouth every day.  Dose:  1 Tab            Allergies  Allergies   Allergen Reactions   • Other Drug Vomiting     All narcotics        DIET  Orders Placed This Encounter   Procedures   • Diet Order Regular (no dairy products)     Standing Status:   Standing     Number of Occurrences:   1     Order Specific Question:   Diet:     Answer:   Regular [1]     Comments:   no dairy products     Order Specific Question:   Miscellaneous modifications:     Answer:   Vegetarian [13]       ACTIVITY  As tolerated.  Weight bearing as tolerated    CONSULTATIONS  GI Dr. Morales  Radiology Dr. Pal    PROCEDURES  Paracentesis 5/24 and 5/26    LABORATORY  Lab Results   Component Value Date    SODIUM 135 05/27/2020    POTASSIUM 4.0 05/27/2020    CHLORIDE 102 05/27/2020    CO2 22 05/27/2020    GLUCOSE 170 (H) 05/27/2020    BUN 16 05/27/2020    CREATININE 0.94 05/27/2020        Lab Results   Component Value Date    WBC 9.6 05/25/2020    HEMOGLOBIN 16.1 (H) 05/25/2020    HEMATOCRIT 47.0 05/25/2020    PLATELETCT 426 05/25/2020        Total time of the discharge process exceeds 48 minutes.

## 2020-05-27 NOTE — CARE PLAN
Problem: Safety  Goal: Will remain free from injury  Outcome: PROGRESSING AS EXPECTED  Note: Pt educated to call for assistance prior to ambulating when feeling weak or dizzy.      Problem: Bowel/Gastric:  Goal: Normal bowel function is maintained or improved  Outcome: PROGRESSING AS EXPECTED  Note: Pt educated to take stool softeners when needed.

## 2020-05-27 NOTE — DISCHARGE PLANNING
Care Transition Team Assessment    Information Source  Orientation : Oriented x 4  Information Given By: Patient  Who is responsible for making decisions for patient? : Patient    Readmission Evaluation  Is this a readmission?: Yes - planned readmission    Elopement Risk  Legal Hold: No  Ambulatory or Self Mobile in Wheelchair: Yes  Disoriented: No  Psychiatric Symptoms: None  History of Wandering: No  Elopement this Admit: No  Vocalizing Wanting to Leave: No  Displays Behaviors, Body Language Wanting to Leave: No-Not at Risk for Elopement  Elopement Risk: Not at Risk for Elopement    Interdisciplinary Discharge Planning  Does Admitting Nurse Feel This Could be a Complex Discharge?: No  Primary Care Physician: Odilon  Patient or legal guardian wants to designate a caregiver (see row info): No  Support Systems: Friends / Neighbors  Housing / Facility: [address]  Do You Take your Prescribed Medications Regularly: Yes  Able to Return to Previous ADL's: Yes  Prior Services: None  Patient Expects to be Discharged to:: Home  Assistance Needed: No    Discharge Preparedness  What is your plan after discharge?: Home with help  What are your discharge supports?: Other (comment)(Friends)  Prior Functional Level: Ambulatory, Independent with Activities of Daily Living, Independent with Medication Management    Functional Assesment  Prior Functional Level: Ambulatory, Independent with Activities of Daily Living, Independent with Medication Management    Vision / Hearing Impairment  Vision Impairment : Yes  Right Eye Vision: Impaired, Wears Glasses  Left Eye Vision: Impaired, Wears Glasses  Hearing Impairment : No    Values / Beliefs / Concerns  Values / Beliefs Concerns : No    Domestic Abuse  Have you ever been the victim of abuse or violence?: No  Physical Abuse or Sexual Abuse: No  Verbal Abuse or Emotional Abuse: No  Possible Abuse Reported to:: Not Applicable    Discharge Risks or Barriers  Discharge risks or  barriers?: Complex medical needs    Anticipated Discharge Information  Anticipated discharge disposition: Home  Discharge Address: [address] [place] [address] [address]  Discharge Contact Phone Number: [phone]

## 2020-05-27 NOTE — CARE PLAN
Problem: Nutritional:  Goal: Achieve adequate nutritional intake  Description: Patient will consume >50% of meals  Outcome: MET   See RD note.

## 2020-05-27 NOTE — DISCHARGE INSTRUCTIONS
Discharge Instructions per Delfina Zhang M.D.    Follow up for abdominal fluid removal in one week as needed    DIET: regular    ACTIVITY: as tolerated    DIAGNOSIS: spontaneous bacterial peritonitis    Return to ER if symptoms worsen    Discharge Instructions    Discharged to home by car with friend. Discharged via walking, hospital escort: Refused.  Special equipment needed: Not Applicable    Be sure to schedule a follow-up appointment with your primary care doctor or any specialists as instructed.     Discharge Plan:        I understand that a diet low in cholesterol, fat, and sodium is recommended for good health. Unless I have been given specific instructions below for another diet, I accept this instruction as my diet prescription.   Other diet: regular    Special Instructions: None    · Is patient discharged on Warfarin / Coumadin?   No     Depression / Suicide Risk    As you are discharged from this [facility] facility, it is important to learn how to keep safe from harming yourself.    Recognize the warning signs:  · Abrupt changes in personality, positive or negative- including increase in energy   · Giving away possessions  · Change in eating patterns- significant weight changes-  positive or negative  · Change in sleeping patterns- unable to sleep or sleeping all the time   · Unwillingness or inability to communicate  · Depression  · Unusual sadness, discouragement and loneliness  · Talk of wanting to die  · Neglect of personal appearance   · Rebelliousness- reckless behavior  · Withdrawal from people/activities they love  · Confusion- inability to concentrate     If you or a loved one observes any of these behaviors or has concerns about self-harm, here's what you can do:  · Talk about it- your feelings and reasons for harming yourself  · Remove any means that you might use to hurt yourself (examples: pills, rope, extension cords, firearm)  · Get professional help from the community (Mental  Health, Substance Abuse, psychological counseling)  · Do not be alone:Call your Safe Contact- someone whom you trust who will be there for you.  · Call your local CRISIS HOTLINE 135-9335 or 319-657-2107  · Call your local Children's Mobile Crisis Response Team [place] (616) 597-2586 or www.QRuso  · Call the toll free National Suicide Prevention Hotlines   · National Suicide Prevention Lifeline 060-376-KDUL (1935)  · National Hope Line Network 800-SUICIDE (463-7470)

## 2020-05-27 NOTE — DIETARY
Nutrition Services: Update   Day 3 of admit.  Columba Pennington is a 74 y.o. female with admitting DX of SBP (spontaneous bacterial peritonitis), Lymphoma.    Consult received for poor PO intake.    Pt is currently on Regular, Vegetarian diet. Initial PO intake poor.  Pt S/P paracentesis yesterday with 5,000 ml removed.  Pt states she is feeling much better and her appetite has returned. Pt now eating 50-75% and % of meals. She ate % of breakfast today.  Obtained menu orders for today and breakfast tomorrow.  Pt states she normally does not eat dairy products, but she is choosing to eat dairy currently.  Discussed with Nutrition Reps and food preferences updated.    Malnutrition Risk: Criteria not met.    Recommendations/Plan:  1. Regular, Vegetarian diet with dairy products ok per pt request.   2. Encourage continued good intake of meals.  3. Document intake of all meals as % taken in ADL's to provide interdisciplinary communication across all shifts.   4. Monitor weight.  5. Nutrition rep will continue to see patient for ongoing meal and snack preferences.  6. Obtain supplement order per RD as needed.    RD to monitor weekly or PRN.

## 2020-05-27 NOTE — PROGRESS NOTES
Patient discharge instructions discussed.  No questions or concerns at this time. Will follow up with GI for paracentesis as needed.  Pt discharged with PICC line.

## 2020-05-27 NOTE — PROGRESS NOTES
PICC line dressing due to be changed today, patient refuses, would like to follow up with her doctor and home nurse.

## 2020-05-27 NOTE — CONSULTS
DATE OF SERVICE:  05/27/2020    REASON FOR CONSULTATION:  Ascites.    HISTORY OF PRESENT ILLNESS:  Patient is a very pleasant 74-year-old female who   is currently in the hospital with worsening ascites.  Patient has a history   of peritoneal carcinomatosis and has been on treatment with chemotherapy.    Patient's primary cause is lymphoma.  Patient has been treated recently and   the tumors have decreased in size.  However, the patient's ascites has   worsened.  That is the reason we are consulted.  Patient reports only symptoms   related to her abdominal distention such as shortness of breath when she gets   very distended as well as abdominal pressure.  Patient denies any nausea,   vomiting, bloody stools, bloody vomitus, fever, chills, chest pain.  Patient   feels well otherwise.    PAST MEDICAL HISTORY:  Arthritis, traumatic brain injury, chronic low back   pain, lymphoma.    PAST SURGICAL HISTORY:  Craniotomy, appendectomy, hip surgery.    FAMILY HISTORY:  Diabetes.    SOCIAL HISTORY:  Patient denies alcohol, tobacco, or illicit drug use.    ALLERGIES:  NARCOTICS.    MEDICATIONS:  See med rec list.    REVIEW OF SYSTEMS:  A 14-point review of systems was obtained and found to be   negative except for those above in the HPI.    PHYSICAL EXAMINATION:  GENERAL:  No acute distress, alert, awake, oriented x3.  VITAL SIGNS:  Stable.  HEENT:  PERRLA.  Extraocular movements intact.  Sclerae are anicteric.  HEART:  Regular rate and rhythm.  LUNGS:  Clear to auscultation bilaterally.  ABDOMEN:  Soft with positive ascites, positive ascitic fluid wave.  No   guarding or rebound.  No significant tenderness other than mild diffuse   pressure on deep palpation.  EXTREMITIES:  No edema noted, bilateral lower extremities.  NEUROLOGIC:  Grossly intact.  PSYCHIATRIC:  Affect is  normal.  SKIN:  No jaundice or pallor.    LABORATORY RESULTS:  Peritoneal fluid shows total WBC count 24325, red blood   cell count 8000, 1% PMNs, 15%  lymphocytes, 3% mononuclear cells, 1%   eosinophils, 80% unidentified cells.  Albumin from ascites fluid is 1.9.    Serum albumin 3.0, albeit ordered a day and a half prior.  AST 18, ALT less   than 5, alkaline phosphatase 81, total bilirubin 0.2.    IMAGING:  CT abdomen and pelvis with contrast shows large volume ascites,   markedly increased from prior exam with improvement of retroperitoneal   adenopathy, significant interval reduction in size of mesenteric mass seen   previously.  No bowel obstruction or evidence of perforation.  The liver is   unremarkable.    ASSESSMENT AND PLAN:  1.  Abdominal ascites.  Suspect this is secondary to malignancy.  Patient's   seg is nondiagnostic as albumin ____ both from serum as well as the ascites on   the same day.  However, it is approximately 1.1 and there is no predominance   of PMNs.  Patient has 1% PMNs not meeting diagnostic criteria for SBP at this   time and no growth of bacteria seen as of yet.  80% of cells were   unidentified.  I suspect these are malignant cells; however, will await   pathology to review these.  Cytology will likely reveal predominance of   malignant cells causing ascites.  I suspect no obvious signs of SBP, although   patient is on antibiotics, which could somewhat mask it at this time.  I do   not suspect that it would mask it this well, however.  Recommend serial   paracentesis for now.  Await cytology and final read from pathologist to make   further recommendations.  No sign of any significant liver disease.  Patient   has normal liver on CT imaging as well as normal AST, ALT, bilirubin and   alkaline phosphatase.  Albumin is borderline low; however, it was normal upon   admission.  Could consider an outpatient FibroSure versus FibroScan if there   was concern for liver injury, although no significant inflammation is seen in   the liver and intrinsic function seems to be intact.  Again, suspect malignant   ascites.  See above for discussion.   Await final pathology read to make   further recommendations.  2.  Peritoneal carcinomatosis.  Defer to primary team.  Patient was seen in   Oncology.  Patient underwent chemotherapy for lymphoma.  3.  Abdominal pain all secondary to pressure.  Could set up serial   paracentesis as an outpatient with Interventional Radiology rather than happen   to go to the ER each time.  4.  We will await final read on cytology as well as cultures to ensure nothing   is growing.  Patient is unlikely to require these antibiotics; however, okay   to continue for now until results are back.  Call with any questions or   concerns.       ____________________________________     Carlton Morales DO    RS / [place]    DD:  05/27/2020 07:58:19  DT:  05/27/2020 11:18:29    D#:  [id number]  Job#:  [id number]

## 2020-05-28 LAB
BACTERIA FLD AEROBE CULT: NORMAL
GRAM STN SPEC: NORMAL
SIGNIFICANT IND 70042: NORMAL
SITE SITE: NORMAL
SOURCE SOURCE: NORMAL

## 2022-11-09 ENCOUNTER — PATIENT MESSAGE (OUTPATIENT)
Dept: HEALTH INFORMATION MANAGEMENT | Facility: OTHER | Age: 77
End: 2022-11-09